# Patient Record
Sex: MALE | Race: BLACK OR AFRICAN AMERICAN | NOT HISPANIC OR LATINO | Employment: OTHER | URBAN - METROPOLITAN AREA
[De-identification: names, ages, dates, MRNs, and addresses within clinical notes are randomized per-mention and may not be internally consistent; named-entity substitution may affect disease eponyms.]

---

## 2024-03-12 ENCOUNTER — APPOINTMENT (OUTPATIENT)
Dept: CARDIOLOGY | Facility: HOSPITAL | Age: 74
End: 2024-03-12
Payer: MEDICARE

## 2024-03-12 ENCOUNTER — APPOINTMENT (OUTPATIENT)
Dept: RADIOLOGY | Facility: HOSPITAL | Age: 74
End: 2024-03-12
Payer: MEDICARE

## 2024-03-12 ENCOUNTER — HOSPITAL ENCOUNTER (OUTPATIENT)
Facility: HOSPITAL | Age: 74
Setting detail: OBSERVATION
Discharge: NURSING, REHAB OR LONG-TERM CARE | End: 2024-03-14
Attending: EMERGENCY MEDICINE | Admitting: INTERNAL MEDICINE
Payer: MEDICARE

## 2024-03-12 DIAGNOSIS — I10 HYPERTENSION, UNSPECIFIED TYPE: ICD-10-CM

## 2024-03-12 DIAGNOSIS — R40.4 UNRESPONSIVE EPISODE: Primary | ICD-10-CM

## 2024-03-12 LAB
ALBUMIN SERPL BCP-MCNC: 4.1 G/DL (ref 3.4–5)
ALP SERPL-CCNC: 88 U/L (ref 33–136)
ALT SERPL W P-5'-P-CCNC: 10 U/L (ref 10–52)
ANION GAP SERPL CALC-SCNC: 11 MMOL/L (ref 10–20)
APPEARANCE UR: CLEAR
AST SERPL W P-5'-P-CCNC: 22 U/L (ref 9–39)
BASOPHILS # BLD AUTO: 0.05 X10*3/UL (ref 0–0.1)
BASOPHILS NFR BLD AUTO: 0.9 %
BILIRUB SERPL-MCNC: 0.3 MG/DL (ref 0–1.2)
BILIRUB UR STRIP.AUTO-MCNC: NEGATIVE MG/DL
BUN SERPL-MCNC: 15 MG/DL (ref 6–23)
CALCIUM SERPL-MCNC: 10.3 MG/DL (ref 8.6–10.3)
CARDIAC TROPONIN I PNL SERPL HS: 8 NG/L (ref 0–20)
CARDIAC TROPONIN I PNL SERPL HS: 9 NG/L (ref 0–20)
CHLORIDE SERPL-SCNC: 105 MMOL/L (ref 98–107)
CO2 SERPL-SCNC: 25 MMOL/L (ref 21–32)
COLOR UR: YELLOW
CREAT SERPL-MCNC: 1.24 MG/DL (ref 0.5–1.3)
EGFRCR SERPLBLD CKD-EPI 2021: 61 ML/MIN/1.73M*2
EOSINOPHIL # BLD AUTO: 0.1 X10*3/UL (ref 0–0.4)
EOSINOPHIL NFR BLD AUTO: 1.8 %
ERYTHROCYTE [DISTWIDTH] IN BLOOD BY AUTOMATED COUNT: 14.3 % (ref 11.5–14.5)
GLUCOSE SERPL-MCNC: 109 MG/DL (ref 74–99)
GLUCOSE UR STRIP.AUTO-MCNC: NEGATIVE MG/DL
HCT VFR BLD AUTO: 44 % (ref 41–52)
HGB BLD-MCNC: 13.7 G/DL (ref 13.5–17.5)
IMM GRANULOCYTES # BLD AUTO: 0.01 X10*3/UL (ref 0–0.5)
IMM GRANULOCYTES NFR BLD AUTO: 0.2 % (ref 0–0.9)
INR PPP: 1.1 (ref 0.9–1.1)
KETONES UR STRIP.AUTO-MCNC: NEGATIVE MG/DL
LEUKOCYTE ESTERASE UR QL STRIP.AUTO: NEGATIVE
LYMPHOCYTES # BLD AUTO: 1.93 X10*3/UL (ref 0.8–3)
LYMPHOCYTES NFR BLD AUTO: 34.5 %
MAGNESIUM SERPL-MCNC: 2.28 MG/DL (ref 1.6–2.4)
MCH RBC QN AUTO: 28.5 PG (ref 26–34)
MCHC RBC AUTO-ENTMCNC: 31.1 G/DL (ref 32–36)
MCV RBC AUTO: 92 FL (ref 80–100)
MONOCYTES # BLD AUTO: 0.39 X10*3/UL (ref 0.05–0.8)
MONOCYTES NFR BLD AUTO: 7 %
NEUTROPHILS # BLD AUTO: 3.12 X10*3/UL (ref 1.6–5.5)
NEUTROPHILS NFR BLD AUTO: 55.6 %
NITRITE UR QL STRIP.AUTO: NEGATIVE
NRBC BLD-RTO: 0 /100 WBCS (ref 0–0)
PH UR STRIP.AUTO: 5 [PH]
PLATELET # BLD AUTO: 322 X10*3/UL (ref 150–450)
POTASSIUM SERPL-SCNC: 5.2 MMOL/L (ref 3.5–5.3)
PROT SERPL-MCNC: 7.9 G/DL (ref 6.4–8.2)
PROT UR STRIP.AUTO-MCNC: NEGATIVE MG/DL
PROTHROMBIN TIME: 12.1 SECONDS (ref 9.8–12.8)
RBC # BLD AUTO: 4.81 X10*6/UL (ref 4.5–5.9)
RBC # UR STRIP.AUTO: NEGATIVE /UL
SODIUM SERPL-SCNC: 136 MMOL/L (ref 136–145)
SP GR UR STRIP.AUTO: 1.02
UROBILINOGEN UR STRIP.AUTO-MCNC: <2 MG/DL
WBC # BLD AUTO: 5.6 X10*3/UL (ref 4.4–11.3)

## 2024-03-12 PROCEDURE — 81003 URINALYSIS AUTO W/O SCOPE: CPT

## 2024-03-12 PROCEDURE — G0378 HOSPITAL OBSERVATION PER HR: HCPCS

## 2024-03-12 PROCEDURE — 85025 COMPLETE CBC W/AUTO DIFF WBC: CPT

## 2024-03-12 PROCEDURE — 93010 ELECTROCARDIOGRAM REPORT: CPT | Performed by: EMERGENCY MEDICINE

## 2024-03-12 PROCEDURE — 99285 EMERGENCY DEPT VISIT HI MDM: CPT | Performed by: EMERGENCY MEDICINE

## 2024-03-12 PROCEDURE — 99285 EMERGENCY DEPT VISIT HI MDM: CPT | Mod: 25

## 2024-03-12 PROCEDURE — 84484 ASSAY OF TROPONIN QUANT: CPT

## 2024-03-12 PROCEDURE — 93005 ELECTROCARDIOGRAM TRACING: CPT | Mod: 59

## 2024-03-12 PROCEDURE — 94640 AIRWAY INHALATION TREATMENT: CPT

## 2024-03-12 PROCEDURE — 2500000002 HC RX 250 W HCPCS SELF ADMINISTERED DRUGS (ALT 637 FOR MEDICARE OP, ALT 636 FOR OP/ED): Performed by: INTERNAL MEDICINE

## 2024-03-12 PROCEDURE — 70450 CT HEAD/BRAIN W/O DYE: CPT

## 2024-03-12 PROCEDURE — 85610 PROTHROMBIN TIME: CPT

## 2024-03-12 PROCEDURE — 2500000004 HC RX 250 GENERAL PHARMACY W/ HCPCS (ALT 636 FOR OP/ED): Performed by: INTERNAL MEDICINE

## 2024-03-12 PROCEDURE — 99222 1ST HOSP IP/OBS MODERATE 55: CPT | Performed by: NURSE PRACTITIONER

## 2024-03-12 PROCEDURE — 99222 1ST HOSP IP/OBS MODERATE 55: CPT

## 2024-03-12 PROCEDURE — 71045 X-RAY EXAM CHEST 1 VIEW: CPT

## 2024-03-12 PROCEDURE — 70450 CT HEAD/BRAIN W/O DYE: CPT | Performed by: RADIOLOGY

## 2024-03-12 PROCEDURE — 83735 ASSAY OF MAGNESIUM: CPT

## 2024-03-12 PROCEDURE — 2500000001 HC RX 250 WO HCPCS SELF ADMINISTERED DRUGS (ALT 637 FOR MEDICARE OP): Performed by: NURSE PRACTITIONER

## 2024-03-12 PROCEDURE — 71045 X-RAY EXAM CHEST 1 VIEW: CPT | Mod: FOREIGN READ | Performed by: RADIOLOGY

## 2024-03-12 PROCEDURE — 36415 COLL VENOUS BLD VENIPUNCTURE: CPT

## 2024-03-12 PROCEDURE — 80053 COMPREHEN METABOLIC PANEL: CPT

## 2024-03-12 RX ORDER — CARVEDILOL 25 MG/1
25 TABLET ORAL
Status: DISCONTINUED | OUTPATIENT
Start: 2024-03-12 | End: 2024-03-14 | Stop reason: HOSPADM

## 2024-03-12 RX ORDER — CLOPIDOGREL BISULFATE 75 MG/1
75 TABLET ORAL DAILY
Status: DISCONTINUED | OUTPATIENT
Start: 2024-03-12 | End: 2024-03-14 | Stop reason: HOSPADM

## 2024-03-12 RX ORDER — UBIDECARENONE 75 MG
250 CAPSULE ORAL DAILY
Status: DISCONTINUED | OUTPATIENT
Start: 2024-03-12 | End: 2024-03-14 | Stop reason: HOSPADM

## 2024-03-12 RX ORDER — IPRATROPIUM BROMIDE 0.5 MG/2.5ML
0.5 SOLUTION RESPIRATORY (INHALATION)
Status: DISCONTINUED | OUTPATIENT
Start: 2024-03-13 | End: 2024-03-14 | Stop reason: HOSPADM

## 2024-03-12 RX ORDER — LISINOPRIL 2.5 MG/1
2.5 TABLET ORAL DAILY
Status: DISCONTINUED | OUTPATIENT
Start: 2024-03-13 | End: 2024-03-14 | Stop reason: HOSPADM

## 2024-03-12 RX ORDER — ALBUTEROL SULFATE 0.83 MG/ML
2.5 SOLUTION RESPIRATORY (INHALATION) EVERY 6 HOURS PRN
Status: DISCONTINUED | OUTPATIENT
Start: 2024-03-12 | End: 2024-03-14 | Stop reason: HOSPADM

## 2024-03-12 RX ORDER — ENOXAPARIN SODIUM 100 MG/ML
40 INJECTION SUBCUTANEOUS EVERY 24 HOURS
Status: DISCONTINUED | OUTPATIENT
Start: 2024-03-12 | End: 2024-03-14 | Stop reason: HOSPADM

## 2024-03-12 RX ORDER — ATORVASTATIN CALCIUM 40 MG/1
40 TABLET, FILM COATED ORAL DAILY
COMMUNITY

## 2024-03-12 RX ORDER — NITROGLYCERIN 0.4 MG/1
0.4 TABLET SUBLINGUAL EVERY 5 MIN PRN
COMMUNITY

## 2024-03-12 RX ORDER — PANTOPRAZOLE SODIUM 20 MG/1
20 TABLET, DELAYED RELEASE ORAL
Status: DISCONTINUED | OUTPATIENT
Start: 2024-03-13 | End: 2024-03-14 | Stop reason: HOSPADM

## 2024-03-12 RX ORDER — IPRATROPIUM BROMIDE 0.5 MG/2.5ML
0.5 SOLUTION RESPIRATORY (INHALATION)
Status: DISCONTINUED | OUTPATIENT
Start: 2024-03-12 | End: 2024-03-12

## 2024-03-12 RX ORDER — RIVASTIGMINE TARTRATE 1.5 MG/1
1.5 CAPSULE ORAL 2 TIMES DAILY
COMMUNITY

## 2024-03-12 RX ORDER — NAPROXEN SODIUM 220 MG/1
81 TABLET, FILM COATED ORAL DAILY
COMMUNITY

## 2024-03-12 RX ORDER — LISINOPRIL AND HYDROCHLOROTHIAZIDE 12.5; 2 MG/1; MG/1
1 TABLET ORAL DAILY
Status: ON HOLD | COMMUNITY
End: 2024-03-14 | Stop reason: WASHOUT

## 2024-03-12 RX ORDER — NITROGLYCERIN 0.4 MG/1
0.4 TABLET SUBLINGUAL EVERY 5 MIN PRN
Status: DISCONTINUED | OUTPATIENT
Start: 2024-03-12 | End: 2024-03-14 | Stop reason: HOSPADM

## 2024-03-12 RX ORDER — CYANOCOBALAMIN (VITAMIN B-12) 250 MCG
250 TABLET ORAL DAILY
COMMUNITY

## 2024-03-12 RX ORDER — CLOPIDOGREL BISULFATE 75 MG/1
75 TABLET ORAL DAILY
COMMUNITY

## 2024-03-12 RX ORDER — TALC
3 POWDER (GRAM) TOPICAL NIGHTLY
COMMUNITY

## 2024-03-12 RX ORDER — CILOSTAZOL 100 MG/1
100 TABLET ORAL 2 TIMES DAILY
COMMUNITY

## 2024-03-12 RX ORDER — PANTOPRAZOLE SODIUM 20 MG/1
20 TABLET, DELAYED RELEASE ORAL
COMMUNITY

## 2024-03-12 RX ORDER — ATORVASTATIN CALCIUM 40 MG/1
40 TABLET, FILM COATED ORAL NIGHTLY
Status: DISCONTINUED | OUTPATIENT
Start: 2024-03-12 | End: 2024-03-14 | Stop reason: HOSPADM

## 2024-03-12 RX ORDER — CARVEDILOL 25 MG/1
25 TABLET ORAL
COMMUNITY

## 2024-03-12 RX ORDER — FLUOXETINE 10 MG/1
10 CAPSULE ORAL DAILY
Status: DISCONTINUED | OUTPATIENT
Start: 2024-03-12 | End: 2024-03-14 | Stop reason: HOSPADM

## 2024-03-12 RX ORDER — TIOTROPIUM BROMIDE INHALATION SPRAY 3.12 UG/1
2 SPRAY, METERED RESPIRATORY (INHALATION) DAILY
COMMUNITY

## 2024-03-12 RX ORDER — RIVASTIGMINE TARTRATE 1.5 MG/1
1.5 CAPSULE ORAL 2 TIMES DAILY
Status: DISCONTINUED | OUTPATIENT
Start: 2024-03-12 | End: 2024-03-14 | Stop reason: HOSPADM

## 2024-03-12 RX ORDER — FLUOXETINE 10 MG/1
10 CAPSULE ORAL DAILY
COMMUNITY

## 2024-03-12 RX ORDER — NAPROXEN SODIUM 220 MG/1
81 TABLET, FILM COATED ORAL DAILY
Status: DISCONTINUED | OUTPATIENT
Start: 2024-03-12 | End: 2024-03-14 | Stop reason: HOSPADM

## 2024-03-12 RX ORDER — ISOSORBIDE MONONITRATE 30 MG/1
30 TABLET, EXTENDED RELEASE ORAL DAILY
Status: DISCONTINUED | OUTPATIENT
Start: 2024-03-12 | End: 2024-03-14 | Stop reason: HOSPADM

## 2024-03-12 RX ORDER — ISOSORBIDE MONONITRATE 30 MG/1
30 TABLET, EXTENDED RELEASE ORAL DAILY
COMMUNITY

## 2024-03-12 RX ORDER — CILOSTAZOL 100 MG/1
100 TABLET ORAL 2 TIMES DAILY
Status: DISCONTINUED | OUTPATIENT
Start: 2024-03-12 | End: 2024-03-14 | Stop reason: HOSPADM

## 2024-03-12 RX ORDER — TALC
3 POWDER (GRAM) TOPICAL NIGHTLY
Status: DISCONTINUED | OUTPATIENT
Start: 2024-03-12 | End: 2024-03-14 | Stop reason: HOSPADM

## 2024-03-12 RX ADMIN — CILOSTAZOL 100 MG: 100 TABLET ORAL at 21:04

## 2024-03-12 RX ADMIN — ENOXAPARIN SODIUM 40 MG: 40 INJECTION SUBCUTANEOUS at 17:58

## 2024-03-12 RX ADMIN — IPRATROPIUM BROMIDE 0.5 MG: 0.5 SOLUTION RESPIRATORY (INHALATION) at 21:11

## 2024-03-12 RX ADMIN — CARVEDILOL 25 MG: 25 TABLET, FILM COATED ORAL at 17:58

## 2024-03-12 RX ADMIN — RIVASTIGMINE TARTRATE 1.5 MG: 1.5 CAPSULE ORAL at 21:03

## 2024-03-12 RX ADMIN — Medication 3 MG: at 21:03

## 2024-03-12 RX ADMIN — ATORVASTATIN CALCIUM 40 MG: 40 TABLET, FILM COATED ORAL at 21:04

## 2024-03-12 SDOH — SOCIAL STABILITY: SOCIAL INSECURITY: HAS ANYONE EVER THREATENED TO HURT YOUR FAMILY OR YOUR PETS?: UNABLE TO ASSESS

## 2024-03-12 SDOH — SOCIAL STABILITY: SOCIAL INSECURITY: DO YOU FEEL UNSAFE GOING BACK TO THE PLACE WHERE YOU ARE LIVING?: UNABLE TO ASSESS

## 2024-03-12 SDOH — SOCIAL STABILITY: SOCIAL INSECURITY: ARE THERE ANY APPARENT SIGNS OF INJURIES/BEHAVIORS THAT COULD BE RELATED TO ABUSE/NEGLECT?: UNABLE TO ASSESS

## 2024-03-12 SDOH — SOCIAL STABILITY: SOCIAL INSECURITY: DOES ANYONE TRY TO KEEP YOU FROM HAVING/CONTACTING OTHER FRIENDS OR DOING THINGS OUTSIDE YOUR HOME?: UNABLE TO ASSESS

## 2024-03-12 SDOH — SOCIAL STABILITY: SOCIAL INSECURITY: HAVE YOU HAD THOUGHTS OF HARMING ANYONE ELSE?: UNABLE TO ASSESS

## 2024-03-12 SDOH — SOCIAL STABILITY: SOCIAL INSECURITY: DO YOU FEEL ANYONE HAS EXPLOITED OR TAKEN ADVANTAGE OF YOU FINANCIALLY OR OF YOUR PERSONAL PROPERTY?: UNABLE TO ASSESS

## 2024-03-12 SDOH — SOCIAL STABILITY: SOCIAL INSECURITY: ABUSE: ADULT

## 2024-03-12 SDOH — SOCIAL STABILITY: SOCIAL INSECURITY: WERE YOU ABLE TO COMPLETE ALL THE BEHAVIORAL HEALTH SCREENINGS?: YES

## 2024-03-12 SDOH — SOCIAL STABILITY: SOCIAL INSECURITY: ARE YOU OR HAVE YOU BEEN THREATENED OR ABUSED PHYSICALLY, EMOTIONALLY, OR SEXUALLY BY ANYONE?: UNABLE TO ASSESS

## 2024-03-12 ASSESSMENT — ACTIVITIES OF DAILY LIVING (ADL)
WALKS IN HOME: DEPENDENT
JUDGMENT_ADEQUATE_SAFELY_COMPLETE_DAILY_ACTIVITIES: NO
ADEQUATE_TO_COMPLETE_ADL: NO
DRESSING YOURSELF: DEPENDENT
BATHING: DEPENDENT
HEARING - RIGHT EAR: FUNCTIONAL
GROOMING: DEPENDENT
LACK_OF_TRANSPORTATION: PATIENT UNABLE TO ANSWER
FEEDING YOURSELF: DEPENDENT
PATIENT'S MEMORY ADEQUATE TO SAFELY COMPLETE DAILY ACTIVITIES?: NO
ASSISTIVE_DEVICE: WALKER
HEARING - LEFT EAR: FUNCTIONAL
TOILETING: DEPENDENT

## 2024-03-12 ASSESSMENT — ENCOUNTER SYMPTOMS
ABDOMINAL PAIN: 0
CONFUSION: 1
WEAKNESS: 1
DIARRHEA: 0
DYSURIA: 0
COLOR CHANGE: 0
FEVER: 0
SHORTNESS OF BREATH: 0
DIZZINESS: 0
CHILLS: 0
SORE THROAT: 0
AGITATION: 0
RHINORRHEA: 0
CHEST TIGHTNESS: 0
PALPITATIONS: 0
ABDOMINAL DISTENTION: 0
NUMBNESS: 0
SINUS PAIN: 0
COUGH: 0
TROUBLE SWALLOWING: 0
FREQUENCY: 0
NECK STIFFNESS: 0
VOMITING: 0
NECK PAIN: 0
WOUND: 0
EYE PAIN: 0
FATIGUE: 0
NAUSEA: 0

## 2024-03-12 ASSESSMENT — COGNITIVE AND FUNCTIONAL STATUS - GENERAL
EATING MEALS: A LITTLE
HELP NEEDED FOR BATHING: A LOT
PERSONAL GROOMING: A LITTLE
DAILY ACTIVITIY SCORE: 15
STANDING UP FROM CHAIR USING ARMS: A LITTLE
WALKING IN HOSPITAL ROOM: A LOT
TOILETING: A LITTLE
MOVING TO AND FROM BED TO CHAIR: A LOT
PATIENT BASELINE BEDBOUND: UNABLE TO ASSESS AT THIS TIME
DRESSING REGULAR UPPER BODY CLOTHING: A LOT
CLIMB 3 TO 5 STEPS WITH RAILING: A LOT
MOBILITY SCORE: 15
DAILY ACTIVITIY SCORE: 16
DRESSING REGULAR UPPER BODY CLOTHING: A LOT
MOVING TO AND FROM BED TO CHAIR: A LOT
HELP NEEDED FOR BATHING: A LOT
STANDING UP FROM CHAIR USING ARMS: A LOT
WALKING IN HOSPITAL ROOM: A LOT
DRESSING REGULAR LOWER BODY CLOTHING: A LITTLE
TURNING FROM BACK TO SIDE WHILE IN FLAT BAD: A LITTLE
DRESSING REGULAR LOWER BODY CLOTHING: A LOT
PERSONAL GROOMING: A LITTLE
MOBILITY SCORE: 17
CLIMB 3 TO 5 STEPS WITH RAILING: A LOT
TOILETING: A LITTLE
EATING MEALS: A LITTLE

## 2024-03-12 ASSESSMENT — LIFESTYLE VARIABLES
AUDIT-C TOTAL SCORE: 0
SKIP TO QUESTIONS 9-10: 1
HOW OFTEN DO YOU HAVE A DRINK CONTAINING ALCOHOL: NEVER
HAVE YOU EVER FELT YOU SHOULD CUT DOWN ON YOUR DRINKING: NO
EVER HAD A DRINK FIRST THING IN THE MORNING TO STEADY YOUR NERVES TO GET RID OF A HANGOVER: NO
AUDIT-C TOTAL SCORE: 0
EVER FELT BAD OR GUILTY ABOUT YOUR DRINKING: NO
HOW MANY STANDARD DRINKS CONTAINING ALCOHOL DO YOU HAVE ON A TYPICAL DAY: PATIENT DOES NOT DRINK
HOW OFTEN DO YOU HAVE 6 OR MORE DRINKS ON ONE OCCASION: NEVER
HAVE PEOPLE ANNOYED YOU BY CRITICIZING YOUR DRINKING: NO

## 2024-03-12 ASSESSMENT — PAIN - FUNCTIONAL ASSESSMENT
PAIN_FUNCTIONAL_ASSESSMENT: 0-10

## 2024-03-12 ASSESSMENT — VISUAL ACUITY: VA_NORMAL: 1

## 2024-03-12 ASSESSMENT — PATIENT HEALTH QUESTIONNAIRE - PHQ9
1. LITTLE INTEREST OR PLEASURE IN DOING THINGS: NOT AT ALL
2. FEELING DOWN, DEPRESSED OR HOPELESS: NOT AT ALL
SUM OF ALL RESPONSES TO PHQ9 QUESTIONS 1 & 2: 0

## 2024-03-12 ASSESSMENT — COLUMBIA-SUICIDE SEVERITY RATING SCALE - C-SSRS
2. HAVE YOU ACTUALLY HAD ANY THOUGHTS OF KILLING YOURSELF?: NO
1. IN THE PAST MONTH, HAVE YOU WISHED YOU WERE DEAD OR WISHED YOU COULD GO TO SLEEP AND NOT WAKE UP?: NO
6. HAVE YOU EVER DONE ANYTHING, STARTED TO DO ANYTHING, OR PREPARED TO DO ANYTHING TO END YOUR LIFE?: NO

## 2024-03-12 ASSESSMENT — PAIN SCALES - GENERAL
PAINLEVEL_OUTOF10: 0 - NO PAIN

## 2024-03-12 NOTE — H&P
HISTORY AND PHYSICAL EXAMINATION - INTERNAL MEDICINE    PATIENT NAME: Bret Harmon  MRN: [id number]  SERVICE DATE: 3/12/2024  SERVICE TIME:  3:15 PM    PRIMARY CARE PHYSICIAN:  Shon Ford MD    SUBJECTIVE  CHIEF COMPLAINT:  unresponsive episode    HISTORY OF PRESENT ILLNESS:  Mr. Harmon is a 73 y.o.  male PMH CAD, PAD, COPD, dementia (A&O x 1 at baseline), hypertension, dyslipidemia, prediabetes, depression sent to ER from [facility] after unresponsive episode.    Patient is currently alert and oriented x 2.  He is able to tell me his name.  He knows that he is in the hospital.  He does not recall the events leading to his hospitalization hence history is collected from ER physician.    Per ER physician, patient had an unresponsive episode around 850 this morning when he was being fed by staff.  Per nursing home report, patient was being fed by staff when he suddenly looked to his right then dipped his chin to his chest and became unresponsive.  Staff lowered him to the floor where he was notably staring at the ceiling and not moving his eyes.  He was not responsive even to sternal rub.  There was no jerking or myoclonic activity.  The episode lasted for approximately 10 minutes before he gradually became more alert and interactive.  By the time EMS arrived 20 minutes later he was able to stand with assist and was back to his baseline mentation (reportedly A&O x 1 at baseline).    At time of my evaluation in the ER this afternoon, patient is resting comfortably in bed.  He has no complaints and denies pain.  Reports he feels like his usual self.  He denies any fevers, chills, chest pain, shortness of breath, abdominal pain, diarrhea, dysuria.    ER workup reviewed:  Patient afebrile and hemodynamically stable  CBC and CMP unremarkable  EKG normal sinus rhythm; right bundle branch block; no acute ischemic changes  Troponins negative x 2  CT head no acute intracranial pathology  Chest x-ray  "unremarkable  Urinalysis negative    Patient to be admitted on telemetry with consults to neurology and cardiology given unresponsive episode of unclear etiology prior to admission.    REVIEW OF SYSTEMS:  A complete 10-point review of systems was performed and negative except per HPI.    PAST MEDICAL HISTORY:  CAD, PAD, COPD, dementia (A&O x 1 at baseline), hypertension, dyslipidemia, prediabetes, depression, GERD    PAST SURGICAL HISTORY: Unknown; patient thinks he has had prior intervention in a leg for PAD    FAMILY HISTORY: Reviewed and noncontributory    SOCIAL HISTORY: Reports he is a former cigarette smoker.  States he quit smoking many years ago.  Currently resides at [facility]    MEDICATIONS:  (Not in a hospital admission)      ALLERGIES:  Allergies   Allergen Reactions    Bee Pollen Runny nose       OBJECTIVE  PHYSICAL EXAM:  /62   Pulse 72   Temp 36.8 °C (98.2 °F) (Temporal)   Resp 18   Ht 1.753 m (5' 9\")   Wt 90.7 kg (200 lb)   SpO2 100%   BMI 29.53 kg/m²   GENERAL: No acute distress.  EYES: Extraocular movements intact.  No scleral icterus.  EARS:  External ears normal.  HEAD: Normocephalic and atraumatic.  NECK: Supple neck.  HEART: Regular rate.      LUNGS: Chest clear to auscultation bilaterally.    ABDOMEN: Soft, non-tender, non-distended.      EXTREMITIES: No cyanosis or edema.  SKIN: Warm and dry.  No rashes.    NEUROLOGIC: Alert and oriented to person and place (knows he is in the hospital).  Does not know the year  PSYCHIATRIC: Appropriate mood and affect.    DATA:  Diagnostic tests reviewed for today's visit:  Most recent labs and imaging results.    ASSESSMENT & PLAN    Unresponsive episode, resolved  Unclear etiology of unresponsive episode prior to admission.  He has returned to baseline and has no complaints presently.  No metabolic abnormalities seen on admission labs.  CT head no acute intracranial pathology.  No evidence of arrhythmia or acute ischemia on EKG; " troponins negative.  Cannot rule out seizure and appreciate neurology consultation.  It is also possible that patient simply fell asleep while eating earlier and was difficult to arouse.      Neurology has evaluated patient and recommend EEG and MRI brain with and without contrast.  I have ordered these studies.  Maintain seizure precautions.  Additionally appreciate cardiology evaluation given patient's cardiovascular history including CAD and PAD by report.  Echocardiogram ordered.  Maintain telemetry to monitor for arrhythmia.  Discussed with ER nurse to obtain orthostatic vital signs.      History of CAD  History of PAD   Continue home daily baby aspirin, Plavix 75 mg daily, cilostazol 100 mg twice daily, Lipitor 40 mg daily, carvedilol 25 mg twice daily, lisinopril 2.5 mg daily, Imdur 30 mg daily.    History of depression  Continue home Prozac 10 mg daily    Dementia  Mentation currently at baseline.  Maintain delirium precautions.  Continue home rivastigmine 1.5 mg twice daily.    COPD  Lungs clear on exam.  Continue home Spiriva.  Albuterol nebs ordered as needed.    History of GERD  Continue home Protonix 20 mg daily    VTE prophylaxis  Subcutaneous Lovenox ordered    Disposition planning:  to be determined; anticipate return to [facility] when medically ready    Plan of care discussed with: Provider, RN, Patient, ER physician, medicine nurse practitioner.    SIGNATURE: Melly Morrow MD  March 12, 2024  3:15 PM

## 2024-03-12 NOTE — ED PROVIDER NOTES
EMERGENCY DEPARTMENT ENCOUNTER      Pt Name: Bret Harmon  MRN: [id number]  Birthdate 1950  Date of evaluation: 3/12/2024  Provider: Vince Rogers MD    CHIEF COMPLAINT       Chief Complaint   Patient presents with    Altered Mental Status         HISTORY OF PRESENT ILLNESS    HPI  Patient is 73-year-old male with a history of dementia, HTN, HLD presenting after an unresponsive episode at his nursing home.  Approximately 8:50 AM, patient was being fed by staff when he suddenly looked to his right than dipped his chin to his chest.  At that time, he was completely unresponsive.  Staff lowered him to the floor where he was notably staring at the ceiling and not moving his eyes.  He was completely unresponsive at that time even to sternal rub.  He did not move and they noticed no myoclonic activity. This episode lasted for at least ten minutes when he gradually began to become more alert and interactive.  By the time EMS arrived at 9:10 AM, he was able to stand with assist and was back to his baseline mentation of alert and oriented x 1 and able to answer simple questions.  This was consistent with his appearance in the emergency department.  At presentation, patient is pan ROS negative and has no complaints.    Nursing Notes were reviewed.    PAST MEDICAL HISTORY   History reviewed. No pertinent past medical history.      SURGICAL HISTORY     History reviewed. No pertinent surgical history.      CURRENT MEDICATIONS       Previous Medications    No medications on file       ALLERGIES     Patient has no known allergies.    FAMILY HISTORY     No family history on file.       SOCIAL HISTORY       Social History     Socioeconomic History    Marital status: Single     Spouse name: None    Number of children: None    Years of education: None    Highest education level: None   Occupational History    None   Tobacco Use    Smoking status: None    Smokeless tobacco: None   Substance and Sexual Activity    Alcohol use: None     Drug use: None    Sexual activity: None   Other Topics Concern    None   Social History Narrative    None     Social Determinants of Health     Financial Resource Strain: Not on file   Food Insecurity: Not on file   Transportation Needs: Not on file   Physical Activity: Not on file   Stress: Not on file   Social Connections: Not on file   Intimate Partner Violence: Not on file   Housing Stability: Not on file       SCREENINGS                        PHYSICAL EXAM    (up to 7 for level 4, 8 or more for level 5)     ED Triage Vitals [03/12/24 0933]   Temperature Heart Rate Respirations BP   36.8 °C (98.2 °F) 86 18 141/69      Pulse Ox Temp Source Heart Rate Source Patient Position   99 % Temporal Monitor Sitting      BP Location FiO2 (%)     Right arm --       Physical Exam  Vitals and nursing note reviewed.   Constitutional:       General: He is not in acute distress.     Appearance: He is not toxic-appearing.   HENT:      Head: Normocephalic and atraumatic.      Mouth/Throat:      Mouth: Mucous membranes are moist.      Pharynx: Oropharynx is clear.   Eyes:      Extraocular Movements: Extraocular movements intact.      Pupils: Pupils are equal, round, and reactive to light.   Cardiovascular:      Rate and Rhythm: Normal rate and regular rhythm.      Heart sounds: Normal heart sounds.   Pulmonary:      Effort: Pulmonary effort is normal. No respiratory distress.      Breath sounds: Normal breath sounds.   Abdominal:      General: There is no distension.      Palpations: Abdomen is soft.      Tenderness: There is no abdominal tenderness.   Musculoskeletal:         General: No swelling or tenderness.      Cervical back: Normal range of motion and neck supple.   Skin:     General: Skin is warm and dry.   Neurological:      Mental Status: He is alert.      Comments: Oriented to person only.  Moving all limbs spontaneously.  No sensory deficits.          DIAGNOSTIC RESULTS     LABS:  Labs Reviewed   CBC WITH AUTO  DIFFERENTIAL - Abnormal       Result Value    WBC 5.6      nRBC 0.0      RBC 4.81      Hemoglobin 13.7      Hematocrit 44.0      MCV 92      MCH 28.5      MCHC 31.1 (*)     RDW 14.3      Platelets 322      Neutrophils % 55.6      Immature Granulocytes %, Automated 0.2      Lymphocytes % 34.5      Monocytes % 7.0      Eosinophils % 1.8      Basophils % 0.9      Neutrophils Absolute 3.12      Immature Granulocytes Absolute, Automated 0.01      Lymphocytes Absolute 1.93      Monocytes Absolute 0.39      Eosinophils Absolute 0.10      Basophils Absolute 0.05     COMPREHENSIVE METABOLIC PANEL - Abnormal    Glucose 109 (*)     Sodium 136      Potassium 5.2      Chloride 105      Bicarbonate 25      Anion Gap 11      Urea Nitrogen 15      Creatinine 1.24      eGFR 61      Calcium 10.3      Albumin 4.1      Alkaline Phosphatase 88      Total Protein 7.9      AST 22      Bilirubin, Total 0.3      ALT 10     MAGNESIUM - Normal    Magnesium 2.28     PROTIME-INR - Normal    Protime 12.1      INR 1.1     SERIAL TROPONIN-INITIAL - Normal    Troponin I, High Sensitivity 8      Narrative:     Less than 99th percentile of normal range cutoff-  Female and children under 18 years old <14 ng/L; Male <21 ng/L: Negative  Repeat testing should be performed if clinically indicated.     Female and children under 18 years old 14-50 ng/L; Male 21-50 ng/L:  Consistent with possible cardiac damage and possible increased clinical   risk. Serial measurements may help to assess extent of myocardial damage.     >50 ng/L: Consistent with cardiac damage, increased clinical risk and  myocardial infarction. Serial measurements may help assess extent of   myocardial damage.      NOTE: Children less than 1 year old may have higher baseline troponin   levels and results should be interpreted in conjunction with the overall   clinical context.     NOTE: Troponin I testing is performed using a different   testing methodology at [facility] than  at other   [facility] hospitals. Direct result comparisons should only   be made within the same method.   URINALYSIS WITH REFLEX CULTURE AND MICROSCOPIC - Normal    Color, Urine Yellow      Appearance, Urine Clear      Specific Gravity, Urine 1.021      pH, Urine 5.0      Protein, Urine NEGATIVE      Glucose, Urine NEGATIVE      Blood, Urine NEGATIVE      Ketones, Urine NEGATIVE      Bilirubin, Urine NEGATIVE      Urobilinogen, Urine <2.0      Nitrite, Urine NEGATIVE      Leukocyte Esterase, Urine NEGATIVE     SERIAL TROPONIN, 1 HOUR - Normal    Troponin I, High Sensitivity 9      Narrative:     Less than 99th percentile of normal range cutoff-  Female and children under 18 years old <14 ng/L; Male <21 ng/L: Negative  Repeat testing should be performed if clinically indicated.     Female and children under 18 years old 14-50 ng/L; Male 21-50 ng/L:  Consistent with possible cardiac damage and possible increased clinical   risk. Serial measurements may help to assess extent of myocardial damage.     >50 ng/L: Consistent with cardiac damage, increased clinical risk and  myocardial infarction. Serial measurements may help assess extent of   myocardial damage.      NOTE: Children less than 1 year old may have higher baseline troponin   levels and results should be interpreted in conjunction with the overall   clinical context.     NOTE: Troponin I testing is performed using a different   testing methodology at [facility] than at [facility]. Direct result comparisons should only   be made within the same method.   TROPONIN SERIES- (INITIAL, 1 HR)    Narrative:     The following orders were created for panel order Troponin I Series, High Sensitivity (0, 1 HR).  Procedure                               Abnormality         Status                     ---------                               -----------         ------                     Troponin I, High Sensiti...[[id number]]  Normal              Final  result               Troponin, High Sensitivi...[[id number]]  Normal              Final result                 Please view results for these tests on the individual orders.   URINALYSIS WITH REFLEX CULTURE AND MICROSCOPIC    Narrative:     The following orders were created for panel order Urinalysis with Reflex Culture and Microscopic.  Procedure                               Abnormality         Status                     ---------                               -----------         ------                     Urinalysis with Reflex C...[[id number]]  Normal              Final result               Extra Urine Gray Tube[[id number]]                            In process                   Please view results for these tests on the individual orders.   EXTRA URINE GRAY TUBE       All other labs were within normal range or not returned as of this dictation.    Imaging  XR chest 1 view   Final Result   No acute pulmonary pathology.   Signed by Stefano Subramanian MD      CT head wo IV contrast   Final Result   Large area of encephalomalacia in the right occipital lobe, which may   be related to prior infarct.        Diffuse volume loss and periventricular white matter changes, which   given patient's age likely represent small vessel ischemic disease.        No CT evidence of acute hemorrhage or acute cortical infarct.        MACRO:   None        Signed by: Cindy Britton 3/12/2024 10:41 AM   Dictation workstation:   [id number]           Procedures  Procedures     EMERGENCY DEPARTMENT COURSE/MDM:     Diagnoses as of 03/12/24 1433   Unresponsive episode        Medical Decision Making  History obtained from patient, EMS, nursing home staff.  Records including labs, imaging, notes reviewed as indicated.  Concern for possible cardiac versus neurologic cause of his unresponsive episode.  Possible syncope or seizure.  CT of the head was unremarkable for acute findings.  Troponin series, CMP, CBC, urinalysis, chest x-ray unremarkable for  acute findings.  Given the length of this unresponsive episode and that he has never had anything like this, the decision was made to admit the patient for further neurologic and cardiac workup.    EKG demonstrated normal sinus rhythm at a rate of 66 bpm, MT interval of 158, QRS duration 140 and QTc of 452.  No acute injury pattern seen.    Patient and or family in agreement and understanding of treatment plan.  All questions answered.      I reviewed the case with the attending ED physician. The attending ED physician agrees with the plan. Patient and/or patient´s representative was counseled regarding labs, imaging, likely diagnosis, and plan. All questions were answered.    ED Medications administered this visit:  Medications - No data to display    New Prescriptions from this visit:    New Prescriptions    No medications on file       Follow-up:  No follow-up provider specified.      Final Impression:   1. Unresponsive episode          (Please note that portions of this note were completed with a voice recognition program.  Efforts were made to edit the dictations but occasionally words are mis-transcribed.)     Vince Rogers MD  Resident  03/12/24 4221

## 2024-03-12 NOTE — H&P
History Of Present Illness  Bret Harmon is a 73 y.o. male from [facility] with past medical history of hypertension, hyperlipidemia, coronary artery disease, COPD, dementia presenting with unresponsive episode at [facility] without myoclonic jerks.  Patient poor historian due to dementia, information obtained from ER report and F records.  Patient's baseline is alert to person only which he is currently.  Patient denies any dizziness, vision changes, sweats, chills, shortness of breath, chest pain, palpitations, nausea/vomiting/diarrhea or urinary symptoms.     CT of the head done today negative for acute process.  Labs done today revealed sodium 136, potassium 5.2, chloride 105, CO2 25, BUN 15, creatinine 1.24, glucose 109, WBCs 5.6, hemoglobin 13.7, hematocrit 44, .  Past Medical History  Past Medical History:   Diagnosis Date    COPD (chronic obstructive pulmonary disease) (CMS/[facility])     Coronary artery disease     Dementia (CMS/[facility])     Depression     Dysphagia     Hyperlipidemia     Hypertension     Insomnia     Muscle weakness     Peripheral vascular disease (CMS/[facility])     Prediabetes     Protein calorie malnutrition (CMS/[facility])        Surgical History  History reviewed. No pertinent surgical history.     Social History  He reports that he has quit smoking. His smoking use included cigarettes. He does not have any smokeless tobacco history on file. He reports that he does not currently use alcohol. He reports that he does not use drugs.    Family History  Family History   Family history unknown: Yes        Allergies  Bee pollen    Review of Systems   Constitutional:  Negative for chills and fever.   HENT:  Negative for sore throat and trouble swallowing.    Eyes:  Negative for pain and visual disturbance.   Respiratory:  Negative for chest tightness and shortness of breath.    Cardiovascular:  Negative for chest pain and palpitations.   Gastrointestinal:  Negative for abdominal pain and nausea.   Genitourinary:   "Negative for dysuria and frequency.   Musculoskeletal:  Negative for neck pain and neck stiffness.   Skin:  Negative for color change, pallor, rash and wound.   Neurological:  Positive for weakness. Negative for dizziness and numbness.   Psychiatric/Behavioral:  Positive for confusion. Negative for agitation.         Physical Exam  Constitutional:       Appearance: Normal appearance.   HENT:      Head: Normocephalic and atraumatic.      Nose: Nose normal.      Mouth/Throat:      Mouth: Mucous membranes are dry.   Eyes:      Extraocular Movements: Extraocular movements intact.   Cardiovascular:      Rate and Rhythm: Normal rate and regular rhythm.      Pulses: Normal pulses.      Heart sounds: Normal heart sounds.   Pulmonary:      Effort: Pulmonary effort is normal.      Breath sounds: Normal breath sounds.   Abdominal:      General: Bowel sounds are normal.      Palpations: Abdomen is soft.   Musculoskeletal:         General: Normal range of motion.      Cervical back: Neck supple.   Skin:     General: Skin is warm and dry.      Capillary Refill: Capillary refill takes less than 2 seconds.   Neurological:      Mental Status: He is alert.      Comments: Oriented to self   Psychiatric:         Mood and Affect: Mood normal.         Behavior: Behavior normal.          Last Recorded Vitals  Blood pressure 167/73, pulse 67, temperature 36.8 °C (98.2 °F), temperature source Temporal, resp. rate 18, height 1.753 m (5' 9\"), weight 90.7 kg (200 lb), SpO2 98 %.    Relevant Results  No current facility-administered medications for this encounter.    Current Outpatient Medications:     aspirin 81 mg chewable tablet, Chew 1 tablet (81 mg) once daily., Disp: , Rfl:     atorvastatin (Lipitor) 40 mg tablet, Take 1 tablet (40 mg) by mouth once daily., Disp: , Rfl:     carvedilol (Coreg) 25 mg tablet, Take 1 tablet (25 mg) by mouth 2 times a day with meals., Disp: , Rfl:     cilostazol (Pletal) 100 mg tablet, Take 1 tablet (100 mg) " by mouth 2 times a day., Disp: , Rfl:     clopidogrel (Plavix) 75 mg tablet, Take 1 tablet (75 mg) by mouth once daily., Disp: , Rfl:     cyanocobalamin (Vitamin B-12) 250 mcg tablet, Take 1 tablet (250 mcg) by mouth once daily., Disp: , Rfl:     FLUoxetine (PROzac) 10 mg capsule, Take 1 capsule (10 mg) by mouth once daily., Disp: , Rfl:     isosorbide mononitrate ER (Imdur) 30 mg 24 hr tablet, Take 1 tablet (30 mg) by mouth once daily. Do not crush or chew., Disp: , Rfl:     lisinopriL-hydrochlorothiazide 20-12.5 mg tablet, Take 1 tablet by mouth once daily., Disp: , Rfl:     melatonin 3 mg tablet, Take 1 tablet (3 mg) by mouth once daily at bedtime., Disp: , Rfl:     nitroglycerin (Nitrostat) 0.4 mg SL tablet, Place 1 tablet (0.4 mg) under the tongue every 5 minutes if needed for chest pain., Disp: , Rfl:     pantoprazole (ProtoNix) 20 mg EC tablet, Take 1 tablet (20 mg) by mouth once daily in the morning. Take before meals. Do not crush, chew, or split., Disp: , Rfl:     rivastigmine (Exelon) 1.5 mg capsule, Take 1 capsule (1.5 mg) by mouth 2 times a day., Disp: , Rfl:     tiotropium (Spiriva Respimat) 2.5 mcg/actuation inhaler, Inhale 2 puffs once daily., Disp: , Rfl:    Results for orders placed or performed during the hospital encounter of 03/12/24 (from the past 24 hour(s))   CBC and Auto Differential   Result Value Ref Range    WBC 5.6 4.4 - 11.3 x10*3/uL    nRBC 0.0 0.0 - 0.0 /100 WBCs    RBC 4.81 4.50 - 5.90 x10*6/uL    Hemoglobin 13.7 13.5 - 17.5 g/dL    Hematocrit 44.0 41.0 - 52.0 %    MCV 92 80 - 100 fL    MCH 28.5 26.0 - 34.0 pg    MCHC 31.1 (L) 32.0 - 36.0 g/dL    RDW 14.3 11.5 - 14.5 %    Platelets 322 150 - 450 x10*3/uL    Neutrophils % 55.6 40.0 - 80.0 %    Immature Granulocytes %, Automated 0.2 0.0 - 0.9 %    Lymphocytes % 34.5 13.0 - 44.0 %    Monocytes % 7.0 2.0 - 10.0 %    Eosinophils % 1.8 0.0 - 6.0 %    Basophils % 0.9 0.0 - 2.0 %    Neutrophils Absolute 3.12 1.60 - 5.50 x10*3/uL    Immature  Granulocytes Absolute, Automated 0.01 0.00 - 0.50 x10*3/uL    Lymphocytes Absolute 1.93 0.80 - 3.00 x10*3/uL    Monocytes Absolute 0.39 0.05 - 0.80 x10*3/uL    Eosinophils Absolute 0.10 0.00 - 0.40 x10*3/uL    Basophils Absolute 0.05 0.00 - 0.10 x10*3/uL   Comprehensive Metabolic Panel   Result Value Ref Range    Glucose 109 (H) 74 - 99 mg/dL    Sodium 136 136 - 145 mmol/L    Potassium 5.2 3.5 - 5.3 mmol/L    Chloride 105 98 - 107 mmol/L    Bicarbonate 25 21 - 32 mmol/L    Anion Gap 11 10 - 20 mmol/L    Urea Nitrogen 15 6 - 23 mg/dL    Creatinine 1.24 0.50 - 1.30 mg/dL    eGFR 61 >60 mL/min/1.73m*2    Calcium 10.3 8.6 - 10.3 mg/dL    Albumin 4.1 3.4 - 5.0 g/dL    Alkaline Phosphatase 88 33 - 136 U/L    Total Protein 7.9 6.4 - 8.2 g/dL    AST 22 9 - 39 U/L    Bilirubin, Total 0.3 0.0 - 1.2 mg/dL    ALT 10 10 - 52 U/L   Magnesium   Result Value Ref Range    Magnesium 2.28 1.60 - 2.40 mg/dL   Protime-INR   Result Value Ref Range    Protime 12.1 9.8 - 12.8 seconds    INR 1.1 0.9 - 1.1   Troponin I, High Sensitivity, Initial   Result Value Ref Range    Troponin I, High Sensitivity 8 0 - 20 ng/L   Troponin, High Sensitivity, 1 Hour   Result Value Ref Range    Troponin I, High Sensitivity 9 0 - 20 ng/L   Urinalysis with Reflex Culture and Microscopic   Result Value Ref Range    Color, Urine Yellow Straw, Yellow    Appearance, Urine Clear Clear    Specific Gravity, Urine 1.021 1.005 - 1.035    pH, Urine 5.0 5.0, 5.5, 6.0, 6.5, 7.0, 7.5, 8.0    Protein, Urine NEGATIVE NEGATIVE mg/dL    Glucose, Urine NEGATIVE NEGATIVE mg/dL    Blood, Urine NEGATIVE NEGATIVE    Ketones, Urine NEGATIVE NEGATIVE mg/dL    Bilirubin, Urine NEGATIVE NEGATIVE    Urobilinogen, Urine <2.0 <2.0 mg/dL    Nitrite, Urine NEGATIVE NEGATIVE    Leukocyte Esterase, Urine NEGATIVE NEGATIVE    XR chest 1 view    Result Date: 3/12/2024  STUDY: Chest Radiograph;  03/12/2024 10:12 AM. INDICATION: Chest pain. COMPARISON: None Available. ACCESSION NUMBER(S):  [id number] ORDERING CLINICIAN: MELLY FERNANDO TECHNIQUE:  Frontal chest was obtained at 11:11 hours. FINDINGS: CARDIOMEDIASTINAL SILHOUETTE: Cardiomediastinal silhouette is normal in size and configuration.  LUNGS: Lungs are clear.  ABDOMEN: No remarkable upper abdominal findings.  BONES: No acute osseous changes.    No acute pulmonary pathology. Signed by Stefano Subramanian MD    CT head wo IV contrast    Result Date: 3/12/2024  Interpreted By:  Cindy Britton, STUDY: CT HEAD WO IV CONTRAST;  3/12/2024 10:06 am   INDICATION: Signs/Symptoms:AMS, slumped over at breakfast and was unresponsivee.   COMPARISON: None.   ACCESSION NUMBER(S): [id number]   ORDERING CLINICIAN: MELLY FERNANDO   TECHNIQUE: Axial images of 5 mm thickness were obtained through the head without intravenous contrast sagittal and coronal reconstructions were acquired.   FINDINGS: BRAIN PARENCHYMA: Periventricular and subcortical white matter changes are present.  No masses are seen. No mass effect.  No acute cortical infarct or mass effect is seen.   Large area of encephalomalacia is noted in the right occipital lobe.   HEMORRHAGE: There is no evidence for hemorrhage.   VENTRICLES and EXTRA-AXIAL SPACES: The ventricles, sulci and cisterns are prominent suggesting volume loss.   INTRACRANIAL VESSELS: No significant atherosclerotic calcification.   EXTRACRANIAL SOFT TISSUES: Within normal limits.   PARANASAL SINUSES/MASTOIDS: Within normal limits.   CALVARIUM: No destructive lesion or depressed skull fracture.       Large area of encephalomalacia in the right occipital lobe, which may be related to prior infarct.   Diffuse volume loss and periventricular white matter changes, which given patient's age likely represent small vessel ischemic disease.   No CT evidence of acute hemorrhage or acute cortical infarct.   MACRO: None   Signed by: Cindy Britton 3/12/2024 10:41 AM Dictation workstation:   [id number]      Assessment/Plan   Principal Problem:     Unresponsive episode    Unresponsive/questionable seizure  Neurology is consulted  Seizure precautions  Neurochecks  Swallow eval prior to eating    Coronary artery disease/hypertension/hyperlipidemia  Home medication regimen as appropriate; imdur/lisinopril/hydrochlorothiazide, carvediolol, plavix    Peripheral vascular disease  Continue home medications as appropriate; pletal    COPD  Continue home inhalers  No current issues    CODE STATUS: Patient is full code according to records from [facility].  Patient states his POA is Azucena but there is no Azucena listed and the person listed as his power of  the number is disconnected.  According to Eden Khan, pt does not have POA.      I spent 60 minutes in the professional and overall care of this patient.      Lashonda Sloan, APRN-CNP

## 2024-03-12 NOTE — CONSULTS
Inpatient consult to Neurology  Consult performed by: Vincent Gagnon MD  Consult ordered by: Cortez Hollingsworth DO      History Of Present Illness  Bret Harmon is a 73 y.o. male presenting with an unresponsive episode.  H/o dementia Aox1 at baseline, COPD, weakness, PVD, HTN, HLD, prediabetes, and MDD. Per resident, patient was said to be unresponsive at 0850 for 15 minutes that then self-resolved to patient's baseline. Episode described as a right gaze then upward and unresponsive to verbal or tactile stimuli. He was following commands and able to stand for EMS when they arrived.     Past Medical History  History reviewed. No pertinent past medical history.  Surgical History  History reviewed. No pertinent surgical history.  Social History     Allergies  Bee pollen  (Not in a hospital admission)      Review of Systems   Constitutional:  Negative for chills, fatigue and fever.   HENT:  Negative for congestion, rhinorrhea and sinus pain.    Respiratory:  Negative for cough and shortness of breath.    Cardiovascular:  Negative for chest pain and palpitations.   Gastrointestinal:  Negative for abdominal distention, abdominal pain, diarrhea, nausea and vomiting.     Neurological Exam  Mental Status  Awake and alert. Oriented only to person. Speech is normal. Follows two-step commands.    Cranial Nerves  CN II: Visual acuity is normal. Visual fields full to confrontation.  CN III, IV, VI: Extraocular movements intact bilaterally. Normal lids and orbits bilaterally. Pupils equal round and reactive to light bilaterally.  CN V: Facial sensation is normal.  CN VII: Full and symmetric facial movement.  CN VIII: Hearing is normal.  CN IX, X: Palate elevates symmetrically. Normal gag reflex.  CN XI: Shoulder shrug strength is normal.  CN XII: Tongue midline without atrophy or fasciculations.    Motor  Normal muscle bulk throughout.  Strength 4/5 BL LE, 5/5 BL UE.    Sensory  Sensation is intact to light touch, pinprick, vibration  "and proprioception in all four extremities.    Physical Exam  Constitutional:       General: He is awake.   HENT:      Head: Normocephalic and atraumatic.      Nose: Nose normal.      Mouth/Throat:      Mouth: Mucous membranes are dry.   Eyes:      General: Lids are normal.      Extraocular Movements: Extraocular movements intact.      Pupils: Pupils are equal, round, and reactive to light.   Cardiovascular:      Rate and Rhythm: Normal rate.      Pulses: Normal pulses.   Pulmonary:      Effort: Pulmonary effort is normal. No respiratory distress.   Abdominal:      General: Abdomen is flat. There is no distension.      Palpations: Abdomen is soft.      Tenderness: There is no abdominal tenderness. There is no guarding.   Musculoskeletal:         General: No swelling, tenderness, deformity or signs of injury. Normal range of motion.      Cervical back: Normal range of motion. No rigidity or tenderness.      Right lower leg: No edema.      Left lower leg: No edema.   Skin:     General: Skin is warm and dry.   Neurological:      General: No focal deficit present.      Mental Status: He is alert. Mental status is at baseline.      Cranial Nerves: No cranial nerve deficit.   Psychiatric:         Mood and Affect: Mood normal.         Speech: Speech normal.         Behavior: Behavior normal.       Last Recorded Vitals  Blood pressure 140/67, pulse 74, temperature 36.8 °C (98.2 °F), temperature source Temporal, resp. rate 18, height 1.753 m (5' 9\"), weight 90.7 kg (200 lb), SpO2 100 %.    Relevant Results  CBC, CMP, coags, troponins, and UA unremarkable.                  Chase Coma Scale  Best Eye Response: Spontaneous  Best Verbal Response: Confused  Best Motor Response: Follows commands  Kenyatta Coma Scale Score: 14               CT head wo IV contrast    Result Date: 3/12/2024  Interpreted By:  Cindy Britton, STUDY: CT HEAD WO IV CONTRAST;  3/12/2024 10:06 am   INDICATION: Signs/Symptoms:AMS, slumped over at breakfast " and was unresponsivee.   COMPARISON: None.   ACCESSION NUMBER(S): [id number]   ORDERING CLINICIAN: MELLY FERNANDO   TECHNIQUE: Axial images of 5 mm thickness were obtained through the head without intravenous contrast sagittal and coronal reconstructions were acquired.   FINDINGS: BRAIN PARENCHYMA: Periventricular and subcortical white matter changes are present.  No masses are seen. No mass effect.  No acute cortical infarct or mass effect is seen.   Large area of encephalomalacia is noted in the right occipital lobe.   HEMORRHAGE: There is no evidence for hemorrhage.   VENTRICLES and EXTRA-AXIAL SPACES: The ventricles, sulci and cisterns are prominent suggesting volume loss.   INTRACRANIAL VESSELS: No significant atherosclerotic calcification.   EXTRACRANIAL SOFT TISSUES: Within normal limits.   PARANASAL SINUSES/MASTOIDS: Within normal limits.   CALVARIUM: No destructive lesion or depressed skull fracture.       Large area of encephalomalacia in the right occipital lobe, which may be related to prior infarct.   Diffuse volume loss and periventricular white matter changes, which given patient's age likely represent small vessel ischemic disease.   No CT evidence of acute hemorrhage or acute cortical infarct.   MACRO: None   Signed by: Cindy Britton 3/12/2024 10:41 AM Dictation workstation:   [id number]  .      Assessment/Plan   Principal Problem:    Unresponsive episode      73-year-old male with a history of dementia presenting with a sudden unresponsive episode for 15 minutes that self-resolved. CT head shows encephalomalacia of the right occipital lobe concerning for remote R PCA infarct with presentation concerning for clinical seizure versus fatigue and sleepiness with dementia.      - Baseline EEG strip  - q4hr neuro checks  - MRI w & wo contrast  - antiepileptic treatment & prophylaxis pending findings    I reviewed labs  I reviewed medications  I independently reviewed and interpreted relevant imaging  studies  The patient's neurologic problem poses a threat to brain and/or bodily function     I obtained information from and discussed with an independent historian  I reviewed relevant records  I discussed differential diagnosis, pathogenesis, prognosis, test results and plan with patient   I discussed management with healthcare professionals involved in this patient's care     Case discussed with attending, Dr. Roach  Neurology service  Emergency Medicine PGY-2   Vincent Gagnon MD

## 2024-03-12 NOTE — CONSULTS
Consults  R42.0.  Of unresponsiveness    J44.9 COPD    I10.0 Hypertension    E78.5 Dyslipidemia    F00.3 Dementia    History Of Present Illness:    Bret Harmon is a 73 y.o. male presenting with unresponsive episode at [facility] with myoclonic jerking.  Patient is extremely poor historian due to his dementia and only ER report and records could be relied upon.  He has a history of hypertension hyperlipidemia coronary disease COPD.  He was reportedly unresponsive for 15 minutes at 850 this morning which self resolved.  He is a former smoker he has prediabetes he may have had PAD intervention in the past.  His EKG shows right bundle with normal RI interval.  His medication list shows that he was on Imdur 30 Coreg 25 twice daily no bradycardia pauses or block of been seen on monitor    In the ED CT was negative for acute process potassium was 5 2 BUN 15 creatinine 1.24 hemoglobin 13.7 hematocrit 44 platelet count 322.  His EKG showed normal sinus rhythm with right bundle branch block both troponins have been negative    Review of old records from  as well as care everywhere were noncontributory      Later doing computer research I learned that there were 2 charts on this identical patient and that all of his useful information from the past was in fact in the identical chart which I ascertained    I learned that he is a patient of Dr. Darren Washington has nonischemic cardiomyopathy 40 to 50% nuclear stress test showed inferolateral scar.  He had failed revascularization left leg then PTA intervention on the left SFA he was seen by Dr. Andreina Ayala within the past year for peripheral vascular disease this data is available in the parallel chart which I have asked to be merged to the current chart    Swapnil a 69 year-old male with a history of PAD, dyslipidemia, hypertension, and abnormal ECG (right bundle branch block) here for routine follow-up.      Overall he feels good. He denies any chest pain or shortness of  breath. He denies any claudication symptoms.     Lexiscan nuclear stress test 8/11/17 demonstrating no ECG evidence of stress-induced ischemia. Nuclear evidence of moderate size inferolateral scar with akinesis. No evidence of ischemia. EF 40%.     Echocardiogram 12/16/14 demonstrating normal LV size with EF 50-55%. Mild hypokinesis of the mid anterolateral extending to the apical lateral wall. Normal RV size and function. Mild TR.     Lexiscan nuclear stress test 12/5/14 demonstrating inferolateral scar with no ischemia        *Active Problems   Problems    · Abnormal EKG (794.31) (R94.31)   · Cardiomyopathy, nonischemic (425.4) (I42.8)   · Coronary artery disease (414.00) (I25.10)   · Hypercholesterolemia (272.0) (E78.00)   · Hypertension (401.9) (I10)   · Pre-operative cardiovascular examination (V72.81) (Z01.810)   · Surgical aftercare, circulatory system (V58.73) (Z48.812)     Atherosclerosis of native arteries of extremity with rest pain (440.22) (I70.229)       Arteriosclerosis of arteries of extremities (440.20) (I70.209)       Atherosclerosis of native artery of both lower extremities with intermittent claudication (440.21) (I70.213)           Current Meds     Medication Name Instruction   Atorvastatin Calcium 10 MG Oral Tablet TAKE 1 TABLET DAILY.   Carvedilol 12.5 MG Oral Tablet TAKE 1 TABLET BY MOUTH TWICE DAILY WITH MEALS   hydroCHLOROthiazide 50 MG Oral Tablet TAKE 1 TABLET DAILY.      Allergies  Medication    · No Known Drug Allergies   Recorded By: Paulette Reese; 11/19/2014 1:08:55 PM     Family History  Mother    · Family history of malignant neoplasm (V16.9) (Z80.9)  Father    · Family history of malignant neoplasm (V16.9) (Z80.9)  Sister    · Family history of diabetes mellitus (V18.0) (Z83.3)     Social History  Problems    · Caffeine use (V49.89) (Z78.9)   · Light tobacco smoker (305.1) (F17.200)   ·    · No advance directives (V49.89) (Z78.9)   · Occasional alcohol use   · Tobacco use  "(305.1) (Z72.0)     Review of Systems  Please see the review of systems sheet from today's visit which I have reviewed and have had scanned into the EMR.      Physical Exam  In general: alert and in no acute distress.   HEENT: Mucous membranes moist, carotid upstrokes normal with no bruits. JVP is normal. No cervical lymphadenopathy. Cranial nerves II-XII grossly intact.  Pulmonary: Clear to auscultation bilaterally.  Cardiovascular: S1,S2, regular. No appreciable murmurs, rubs or gallops.   Lower extremities: Warm. 1+ distal pulses. No edema.  Skin: warm and dry. No obvious rashes visualized.  Psychiatric: Oriented to person, place and time. No obvious agitation.           Last Recorded Vitals:  Vitals:    03/12/24 1521 03/12/24 1558 03/12/24 1600 03/12/24 1709   BP: 140/67 167/73 167/73 149/65   BP Location:       Patient Position: Standing      Pulse: 74 67 64 64   Resp:  18 13 16   Temp:    36.6 °C (97.9 °F)   TempSrc:       SpO2:  98% 99% 97%   Weight:       Height:    1.803 m (5' 11\")       Last Labs:  CBC - 3/12/2024:  9:51 AM  5.6 13.7 322    44.0      CMP - 3/12/2024:  9:51 AM  10.3 7.9 22 --- 0.3   _ 4.1 10 88      PTT - No results in last year.  1.1   12.1 _     Troponin I, High Sensitivity   Date/Time Value Ref Range Status   03/12/2024 11:18 AM 9 0 - 20 ng/L Final   03/12/2024 09:51 AM 8 0 - 20 ng/L Final      Last I/O:  I/O last 3 completed shifts:  In: - (0 mL/kg)   Out: 350 (3.9 mL/kg) [Urine:350 (0.1 mL/kg/hr)]  Weight: 90.7 kg     Past Cardiology Tests (Last 3 Years):  EKG:  No results found for this or any previous visit from the past 1095 days.    Echo:  No results found for this or any previous visit from the past 1095 days.    Ejection Fractions:  No results found for: \"EF\"  Cath:  No results found for this or any previous visit from the past 1095 days.    Stress Test:  No results found for this or any previous visit from the past 1095 days.    Cardiac Imaging:  No results found for this or any " previous visit from the past 1095 days.      Past Medical History:  He has a past medical history of COPD (chronic obstructive pulmonary disease) (CMS/[facility]), Coronary artery disease, Dementia (CMS/[facility]), Depression, Dysphagia, Hyperlipidemia, Hypertension, Insomnia, Muscle weakness, Peripheral vascular disease (CMS/[facility]), Prediabetes, and Protein calorie malnutrition (CMS/[facility]).    Past Surgical History:  He has no past surgical history on file.      Social History:  He reports that he has quit smoking. His smoking use included cigarettes. He does not have any smokeless tobacco history on file. He reports that he does not currently use alcohol. He reports that he does not use drugs.    Family History:  Family History   Family history unknown: Yes        Allergies:  Bee pollen    Inpatient Medications:  Scheduled medications   Medication Dose Route Frequency    aspirin  81 mg oral Daily    atorvastatin  40 mg oral Nightly    carvedilol  25 mg oral BID with meals    cilostazol  100 mg oral BID    clopidogrel  75 mg oral Daily    cyanocobalamin  250 mcg oral Daily    enoxaparin  40 mg subcutaneous q24h    FLUoxetine  10 mg oral Daily    ipratropium  0.5 mg nebulization q6h    isosorbide mononitrate ER  30 mg oral Daily    [START ON 3/13/2024] lisinopril  2.5 mg oral Daily    melatonin  3 mg oral Nightly    [START ON 3/13/2024] pantoprazole  20 mg oral Daily before breakfast    perflutren lipid microspheres  0.5-10 mL of dilution intravenous Once in imaging    perflutren protein A microsphere  0.5 mL intravenous Once in imaging    rivastigmine  1.5 mg oral BID     PRN medications   Medication    albuterol    nitroglycerin     Continuous Medications   Medication Dose Last Rate     Outpatient Medications:  Current Outpatient Medications   Medication Instructions    aspirin 81 mg, oral, Daily    atorvastatin (LIPITOR) 40 mg, oral, Daily    carvedilol (COREG) 25 mg, oral, 2 times daily with meals    cilostazol (PLETAL) 100 mg,  oral, 2 times daily    clopidogrel (PLAVIX) 75 mg, oral, Daily    cyanocobalamin (VITAMIN B-12) 250 mcg, oral, Daily    FLUoxetine (PROZAC) 10 mg, oral, Daily    isosorbide mononitrate ER (IMDUR) 30 mg, oral, Daily, Do not crush or chew.    lisinopriL-hydrochlorothiazide 20-12.5 mg tablet 1 tablet, oral, Daily    melatonin 3 mg, oral, Nightly    nitroglycerin (NITROSTAT) 0.4 mg, sublingual, Every 5 min PRN    pantoprazole (PROTONIX) 20 mg, oral, Daily before breakfast, Do not crush, chew, or split.    rivastigmine (EXELON) 1.5 mg, oral, 2 times daily    tiotropium (Spiriva Respimat) 2.5 mcg/actuation inhaler 2 puffs, inhalation, Daily     Results for orders placed or performed during the hospital encounter of 03/12/24 (from the past 96 hour(s))   CBC and Auto Differential   Result Value Ref Range    WBC 5.6 4.4 - 11.3 x10*3/uL    nRBC 0.0 0.0 - 0.0 /100 WBCs    RBC 4.81 4.50 - 5.90 x10*6/uL    Hemoglobin 13.7 13.5 - 17.5 g/dL    Hematocrit 44.0 41.0 - 52.0 %    MCV 92 80 - 100 fL    MCH 28.5 26.0 - 34.0 pg    MCHC 31.1 (L) 32.0 - 36.0 g/dL    RDW 14.3 11.5 - 14.5 %    Platelets 322 150 - 450 x10*3/uL    Neutrophils % 55.6 40.0 - 80.0 %    Immature Granulocytes %, Automated 0.2 0.0 - 0.9 %    Lymphocytes % 34.5 13.0 - 44.0 %    Monocytes % 7.0 2.0 - 10.0 %    Eosinophils % 1.8 0.0 - 6.0 %    Basophils % 0.9 0.0 - 2.0 %    Neutrophils Absolute 3.12 1.60 - 5.50 x10*3/uL    Immature Granulocytes Absolute, Automated 0.01 0.00 - 0.50 x10*3/uL    Lymphocytes Absolute 1.93 0.80 - 3.00 x10*3/uL    Monocytes Absolute 0.39 0.05 - 0.80 x10*3/uL    Eosinophils Absolute 0.10 0.00 - 0.40 x10*3/uL    Basophils Absolute 0.05 0.00 - 0.10 x10*3/uL   Comprehensive Metabolic Panel   Result Value Ref Range    Glucose 109 (H) 74 - 99 mg/dL    Sodium 136 136 - 145 mmol/L    Potassium 5.2 3.5 - 5.3 mmol/L    Chloride 105 98 - 107 mmol/L    Bicarbonate 25 21 - 32 mmol/L    Anion Gap 11 10 - 20 mmol/L    Urea Nitrogen 15 6 - 23 mg/dL     Creatinine 1.24 0.50 - 1.30 mg/dL    eGFR 61 >60 mL/min/1.73m*2    Calcium 10.3 8.6 - 10.3 mg/dL    Albumin 4.1 3.4 - 5.0 g/dL    Alkaline Phosphatase 88 33 - 136 U/L    Total Protein 7.9 6.4 - 8.2 g/dL    AST 22 9 - 39 U/L    Bilirubin, Total 0.3 0.0 - 1.2 mg/dL    ALT 10 10 - 52 U/L   Magnesium   Result Value Ref Range    Magnesium 2.28 1.60 - 2.40 mg/dL   Protime-INR   Result Value Ref Range    Protime 12.1 9.8 - 12.8 seconds    INR 1.1 0.9 - 1.1   Troponin I, High Sensitivity, Initial   Result Value Ref Range    Troponin I, High Sensitivity 8 0 - 20 ng/L   Troponin, High Sensitivity, 1 Hour   Result Value Ref Range    Troponin I, High Sensitivity 9 0 - 20 ng/L   Urinalysis with Reflex Culture and Microscopic   Result Value Ref Range    Color, Urine Yellow Straw, Yellow    Appearance, Urine Clear Clear    Specific Gravity, Urine 1.021 1.005 - 1.035    pH, Urine 5.0 5.0, 5.5, 6.0, 6.5, 7.0, 7.5, 8.0    Protein, Urine NEGATIVE NEGATIVE mg/dL    Glucose, Urine NEGATIVE NEGATIVE mg/dL    Blood, Urine NEGATIVE NEGATIVE    Ketones, Urine NEGATIVE NEGATIVE mg/dL    Bilirubin, Urine NEGATIVE NEGATIVE    Urobilinogen, Urine <2.0 <2.0 mg/dL    Nitrite, Urine NEGATIVE NEGATIVE    Leukocyte Esterase, Urine NEGATIVE NEGATIVE       Physical Exam:  Subjective:   Examination:   General Examination:   General Appearance: Well developed, well nourished, in no acute distress.   Head: normocephalic, atraumatic   Eyes: Anicteric sclera. Pupils are equally round and reactive to light.  Extraocular movements are intact.    Ears: normal   Oral: Cavity: mucosa moist.   Throat: clear.   Neck/Thyroid: neck supple, full range of motion, no cervical lymphadenopathy.   Skin: warm and dry, no suspicious lesions.    Heart: regular rate and rhythm, S1, S2 normal, no murmurs.   Lungs: clear to auscultation bilaterally.   Abdomen: soft, non-tender, non-distended, bowl sound present, normal.   Extremities: no clubbing, no cyanosis, no edema.    Neuro: non-focal, motor strength normal upper lower extremities, sensory exam intact.       Assessment/Plan      R42.0.  Of unresponsiveness    J44.9 COPD    I10.0 Hypertension    E78.5 Dyslipidemia    F00.3 Dementia      Code Status:  No Order    I spent 35 minutes in the professional and overall care of this patient.        Seth Rodriguez MD

## 2024-03-13 ENCOUNTER — APPOINTMENT (OUTPATIENT)
Dept: CARDIOLOGY | Facility: HOSPITAL | Age: 74
End: 2024-03-13
Payer: MEDICARE

## 2024-03-13 ENCOUNTER — HOSPITAL ENCOUNTER (OUTPATIENT)
Dept: NEUROLOGY | Facility: HOSPITAL | Age: 74
Setting detail: OBSERVATION
Discharge: HOME | End: 2024-03-13
Payer: MEDICARE

## 2024-03-13 LAB
AORTIC VALVE PEAK VELOCITY: 1.01 M/S
AV PEAK GRADIENT: 4.1 MMHG
AVA (PEAK VEL): 4.32 CM2
EJECTION FRACTION APICAL 4 CHAMBER: 45.9
EJECTION FRACTION: 52 %
HOLD SPECIMEN: NORMAL
LEFT VENTRICLE INTERNAL DIMENSION DIASTOLE: 3.7 CM (ref 3.5–6)
LEFT VENTRICULAR OUTFLOW TRACT DIAMETER: 2.3 CM
MITRAL VALVE E/A RATIO: 0.52
MITRAL VALVE E/E' RATIO: 8.31
RIGHT VENTRICLE FREE WALL PEAK S': 6.89 CM/S
RIGHT VENTRICLE PEAK SYSTOLIC PRESSURE: 17.5 MMHG
TRICUSPID ANNULAR PLANE SYSTOLIC EXCURSION: 1.7 CM

## 2024-03-13 PROCEDURE — 2500000001 HC RX 250 WO HCPCS SELF ADMINISTERED DRUGS (ALT 637 FOR MEDICARE OP): Performed by: NURSE PRACTITIONER

## 2024-03-13 PROCEDURE — 2500000002 HC RX 250 W HCPCS SELF ADMINISTERED DRUGS (ALT 637 FOR MEDICARE OP, ALT 636 FOR OP/ED): Mod: MUE | Performed by: NURSE PRACTITIONER

## 2024-03-13 PROCEDURE — 2500000001 HC RX 250 WO HCPCS SELF ADMINISTERED DRUGS (ALT 637 FOR MEDICARE OP): Performed by: INTERNAL MEDICINE

## 2024-03-13 PROCEDURE — 93306 TTE W/DOPPLER COMPLETE: CPT

## 2024-03-13 PROCEDURE — 99232 SBSQ HOSP IP/OBS MODERATE 35: CPT

## 2024-03-13 PROCEDURE — 2500000004 HC RX 250 GENERAL PHARMACY W/ HCPCS (ALT 636 FOR OP/ED): Performed by: NURSE PRACTITIONER

## 2024-03-13 PROCEDURE — G0378 HOSPITAL OBSERVATION PER HR: HCPCS

## 2024-03-13 PROCEDURE — 2500000002 HC RX 250 W HCPCS SELF ADMINISTERED DRUGS (ALT 637 FOR MEDICARE OP, ALT 636 FOR OP/ED): Mod: MUE | Performed by: INTERNAL MEDICINE

## 2024-03-13 PROCEDURE — 94640 AIRWAY INHALATION TREATMENT: CPT | Mod: MUEWO

## 2024-03-13 PROCEDURE — 95816 EEG AWAKE AND DROWSY: CPT

## 2024-03-13 PROCEDURE — 92610 EVALUATE SWALLOWING FUNCTION: CPT | Mod: GN | Performed by: SPEECH-LANGUAGE PATHOLOGIST

## 2024-03-13 PROCEDURE — 2500000004 HC RX 250 GENERAL PHARMACY W/ HCPCS (ALT 636 FOR OP/ED): Performed by: INTERNAL MEDICINE

## 2024-03-13 PROCEDURE — 94664 DEMO&/EVAL PT USE INHALER: CPT

## 2024-03-13 PROCEDURE — 95816 EEG AWAKE AND DROWSY: CPT | Performed by: PSYCHIATRY & NEUROLOGY

## 2024-03-13 RX ADMIN — RIVASTIGMINE TARTRATE 1.5 MG: 1.5 CAPSULE ORAL at 08:59

## 2024-03-13 RX ADMIN — CILOSTAZOL 100 MG: 100 TABLET ORAL at 21:18

## 2024-03-13 RX ADMIN — PERFLUTREN 2 ML OF DILUTION: 6.52 INJECTION, SUSPENSION INTRAVENOUS at 14:54

## 2024-03-13 RX ADMIN — FLUOXETINE 10 MG: 10 CAPSULE ORAL at 08:59

## 2024-03-13 RX ADMIN — IPRATROPIUM BROMIDE 0.5 MG: 0.5 SOLUTION RESPIRATORY (INHALATION) at 20:32

## 2024-03-13 RX ADMIN — PANTOPRAZOLE SODIUM 20 MG: 20 TABLET, DELAYED RELEASE ORAL at 06:22

## 2024-03-13 RX ADMIN — CYANOCOBALAMIN TAB 500 MCG 250 MCG: 500 TAB at 08:59

## 2024-03-13 RX ADMIN — CILOSTAZOL 100 MG: 100 TABLET ORAL at 09:00

## 2024-03-13 RX ADMIN — ENOXAPARIN SODIUM 40 MG: 40 INJECTION SUBCUTANEOUS at 18:58

## 2024-03-13 RX ADMIN — ISOSORBIDE MONONITRATE 30 MG: 30 TABLET, EXTENDED RELEASE ORAL at 08:59

## 2024-03-13 RX ADMIN — CARVEDILOL 25 MG: 25 TABLET, FILM COATED ORAL at 16:51

## 2024-03-13 RX ADMIN — CLOPIDOGREL BISULFATE 75 MG: 75 TABLET ORAL at 08:59

## 2024-03-13 RX ADMIN — Medication 3 MG: at 21:18

## 2024-03-13 RX ADMIN — ASPIRIN 81 MG 81 MG: 81 TABLET ORAL at 08:59

## 2024-03-13 RX ADMIN — LISINOPRIL 2.5 MG: 2.5 TABLET ORAL at 08:59

## 2024-03-13 RX ADMIN — CARVEDILOL 25 MG: 25 TABLET, FILM COATED ORAL at 08:59

## 2024-03-13 RX ADMIN — IPRATROPIUM BROMIDE 0.5 MG: 0.5 SOLUTION RESPIRATORY (INHALATION) at 09:13

## 2024-03-13 RX ADMIN — ATORVASTATIN CALCIUM 40 MG: 40 TABLET, FILM COATED ORAL at 21:18

## 2024-03-13 RX ADMIN — RIVASTIGMINE TARTRATE 1.5 MG: 1.5 CAPSULE ORAL at 21:18

## 2024-03-13 ASSESSMENT — PAIN SCALES - GENERAL
PAINLEVEL_OUTOF10: 0 - NO PAIN

## 2024-03-13 ASSESSMENT — COGNITIVE AND FUNCTIONAL STATUS - GENERAL
EATING MEALS: A LITTLE
DRESSING REGULAR UPPER BODY CLOTHING: A LOT
DRESSING REGULAR LOWER BODY CLOTHING: A LOT
MOBILITY SCORE: 17
WALKING IN HOSPITAL ROOM: A LITTLE
HELP NEEDED FOR BATHING: A LOT
TOILETING: A LOT
MOVING TO AND FROM BED TO CHAIR: A LITTLE
TURNING FROM BACK TO SIDE WHILE IN FLAT BAD: A LITTLE
PERSONAL GROOMING: A LOT
STANDING UP FROM CHAIR USING ARMS: A LITTLE
CLIMB 3 TO 5 STEPS WITH RAILING: TOTAL
DAILY ACTIVITIY SCORE: 13

## 2024-03-13 ASSESSMENT — VISUAL ACUITY: VA_NORMAL: 1

## 2024-03-13 ASSESSMENT — PAIN - FUNCTIONAL ASSESSMENT
PAIN_FUNCTIONAL_ASSESSMENT: 0-10

## 2024-03-13 NOTE — PROGRESS NOTES
"Speech-Language Pathology    SLP Adult Inpatient Speech-Language Pathology Clinical Swallow Evaluation    Patient Name: Bret Harmon  MRN: [id number]  Today's Date: 3/13/2024   Time Calculation  Start Time: 1635  Stop Time: 1645  Time Calculation (min): 10 min         Current Problem:   1. Unresponsive episode  EEG    Transthoracic Echo (TTE) Complete    Transthoracic Echo (TTE) Complete    CANCELED: Transthoracic Echo (TTE) Complete    CANCELED: Transthoracic Echo (TTE) Complete            Recommendations:  Risk for Aspiration: Other (Comment) (yes due to baseline of dementia, however not highly suspected per this assessment or imaging completed this admission)  Solid Diet Recommendations : Regular (IDDSI Level 7)  Liquid Diet Recommendations: Thin (IDDSI Level 0)  Compensatory Swallowing Strategies: Upright 90 degrees as possible for all oral intake, Remain upright for 20-30 minutes after meals, Small bites/sips, Eat/feed slowly  Medication Administration Recommendations: Other (Comment) (as best tolerated)      Assessment:  Assessment Results:   Patient presents with suspected functional oropharyngeal swallow upon completion of clinical swallow evaluation at bedside this date. Examination of oral mechanism was remarkable for decreased lingual strength and agility. Patient did not \"pass\" [place] Swallow Protocol (3 oz water challenge) due to stopping prior to completion; however, patient tolerated PO trials of thin liquids via cup, pureed solids, soft solids, and regular solids absent of overt s/s of aspiration. Patient with mildly prolonged but functional mastication, in the absence of bottom dentures.    Per this assessment, patient is appropriate to continue baseline diet with standard aspiration precautions. Patient is at some risk for aspiration due to baseline of dementia; however, chest XR upon admission was clear and CT Head did not reveal concern for any acute neurological changes.    ST to sign off, but remains " "available upon re-consultation should new concerns arise.    Treatment Provided: No      Plan:  Inpatient/Swing Bed or Outpatient: Inpatient  SLP Plan: No skilled SLP  No Skilled SLP: At baseline function, Independent with swallowing  SLP Discharge Recommendations: D/C as patient is functional for this level of care  SLP - OK to Discharge: Yes      Subjective   Current Problem:  Patient denied dysphagia, globus sensation, or coughing/choking with PO intake.      General Visit Information:  Patient Class: Inpatient  Living Environment: Nursing home (skilled/long-term)  Caregiver Feedback: RN reported patient ate breakfast sitting up in chair, and then took a nap through lunch but would receive a tray when he returned from test.  Ordering Physician: ASHA Brewer  Reason for Referral: Swallow evaluation, determine if MBS is needed  Past Medical History Relevant to Rehab: dementia, HTN, HLD, COPD, GERD  Prior Level of Function: Decreased function  Patient Seen During This Visit: Yes  Total Number of Visits : 1  Prior to Session Communication: Bedside nurse  Date of Onset: 03/12/24  Date of Order: 03/12/24  [employer] Diet: Regular solids and thin liquids, per transfer paperwork from facility  Current Diet : Regular solids and thin liquids  Dysphagia Diagnosis: Other (Comment) (suspected functional oropharyngeal swallow)      Objective       Baseline Assessment:  Respiratory Status: Room air  History of Intubation: No  Behavior/Cognition: Alert, Cooperative  Patient Positioning: Upright in Chair  Baseline Vocal Quality: Normal  Volitional Cough: Strong  Volitional Swallow: Within Functional Limits    Relevant Imaging:    Chest XR 3/12/24: \"IMPRESSION:  No acute pulmonary pathology.\"    CT Head 3/12/24: \"IMPRESSION:  Large area of encephalomalacia in the right occipital lobe, which may  be related to prior infarct.  Diffuse volume loss and periventricular white matter changes, which  given patient's age " "likely represent small vessel ischemic disease.  No CT evidence of acute hemorrhage or acute cortical infarct. \"      Pain:  Pain Assessment: 0-10  Pain Score: 0 - No pain       Oral/Motor Assessment:  Oral Hygiene: Good  Dentition: Dentures (Upper Full), Other (Comment) (also wears lower dentures, but they are not in the hospital)  Oral Motor: Impaired Function  Lingual Agility: Reduced  Lingual Strength: Reduced  Apraxia: None present  Intelligibility: Intelligible  Breath Support: Adequate for speech      Consistencies Trialed:  Consistencies Trialed: Yes  Consistencies Trialed: Thin (IDDSI Level 0) - Straw, Pureed/extremely thick (IDDSI Level 4), Soft & bite sized/chopped (IDDSI Level 6), Regular (IDDSI Level 7)      Clinical Observations:  Patient Positioning: Upright in Chair  Management of Oral Secretions: Adequate  Was The 3 oz Swallow Protocol Completed: Yes, Other (Comment) (did not \"pass\" due to stopping prior to completion)    Inpatient:    Education:  Learner patient   Barriers to Learning cognitive limitations barrier   Method demonstration; verbal   Education - Topic ST provided patient education regarding role of ST, purpose of assessment, clinical impressions, and recommendations. Patient verbalized questionable full comprehension, consistent with cognitive status. ST further coordinated with RN regarding recommendations per this assessment, with RN verbalizing understanding.     Outcome    Needs review/reinforcement       "

## 2024-03-13 NOTE — PROGRESS NOTES
Occupational Therapy                 Therapy Communication Note    Patient Name: Bret Harmon  MRN: [id number]  Today's Date: 3/13/2024     Discipline: Occupational Therapy    Comment: Received orders for OT eval 3/12. Completed chart review, and pt is a LTC resident at [facility] with plans to return. Pt does not have any OT needs at this time. Will discharged OT services.

## 2024-03-13 NOTE — CARE PLAN
The patient's goals for the shift include NO FALLS    The clinical goals for the shift include PT. WILL HAVE NO MORE EPISODES OF UNRESPONSIVENESS

## 2024-03-13 NOTE — PROGRESS NOTES
"INTERNAL MEDICINE PROGRESS NOTE    PATIENT NAME: Bret Harmon  MRN: [id number]  SERVICE DATE:  March 13, 2024  SERVICE TIME:  10:18 AM    ATTENDING PHYSICIAN:  Melly Morrow MD    SUBJECTIVE    INTERVAL HPI:  No overnight events.  Patient resting comfortably in chair at time of my evaluation.  He has no complaints.  Remains alert and oriented to self and place (knows he is in the hospital although misidentifies hospital as [facility]).  Does not know the year.  He continues to deny chest pain, shortness of breath, lightheadedness, dizziness.  EEG, brain MRI, echo not yet  performed.    REVIEW OF SYSTEMS:  A 4-point review of systems was performed and negative except per HPI.    MEDICATIONS: Reviewed    PAST MEDICAL HISTORY:    Past Medical History:   Diagnosis Date    COPD (chronic obstructive pulmonary disease) (CMS/HCC)     Coronary artery disease     Dementia (CMS/HCC)     Depression     Dysphagia     Hyperlipidemia     Hypertension     Insomnia     Muscle weakness     Peripheral vascular disease (CMS/HCC)     Prediabetes     Protein calorie malnutrition (CMS/HCC)        OBJECTIVE  PHYSICAL EXAM:  /58 (BP Location: Left arm, Patient Position: Lying)   Pulse 65   Temp 36 °C (96.8 °F) (Temporal)   Resp 16   Ht 1.803 m (5' 11\")   Wt 90.7 kg (200 lb)   SpO2 98%   BMI 27.89 kg/m²   GENERAL: No acute distress.  EYES: Extraocular movements intact.  No scleral icterus.  EARS:  External ears normal.  HEAD: Normocephalic and atraumatic.  NECK: Supple neck.  HEART: Regular rate.      LUNGS: Chest clear to auscultation bilaterally.    ABDOMEN: Soft, non-tender, non-distended.      EXTREMITIES: No cyanosis or edema.  SKIN: Warm and dry.  No rashes.    NEUROLOGIC: Alert and oriented x2.    PSYCHIATRIC: Appropriate mood and affect.    DATA:  Diagnostic tests reviewed for today's visit:  Most recent labs and imaging results.    ASSESSMENT AND PLAN    Unresponsive episode, resolved  Unclear etiology of " unresponsive episode prior to admission.  He has returned to baseline and has no complaints presently.  No metabolic abnormalities seen on admission labs.  CT head no acute intracranial pathology.  No evidence of arrhythmia or acute ischemia on EKG; troponins negative.  Orthostatic vital signs negative in ER.      Cannot rule out seizure and appreciate neurology consultation. It is also possible that patient simply fell asleep while eating earlier and was difficult to arouse.       Neurology has evaluated patient and recommend EEG and MRI brain with and without contrast.  Await performance of these studies.  Maintain seizure precautions.  Additionally appreciate cardiology evaluation given patient's cardiovascular history including CAD and PAD.  Echocardiogram ordered (not yet performed).  Maintain telemetry to monitor for arrhythmia.       History of CAD  History of PAD   Continue home daily baby aspirin, Plavix 75 mg daily, cilostazol 100 mg twice daily, Lipitor 40 mg daily, carvedilol 25 mg twice daily, lisinopril 2.5 mg daily, Imdur 30 mg daily.     History of depression  Continue home Prozac 10 mg daily     Dementia  Mentation currently at baseline.  Maintain delirium precautions. Continue home rivastigmine 1.5 mg twice daily.     COPD  Lungs clear on exam.  Continue home Spiriva (substituted per pharmacy protocol).  Albuterol nebs ordered as needed.     History of GERD  Continue home Protonix 20 mg daily     VTE prophylaxis  Subcutaneous Lovenox ordered     Disposition planning:  to be determined; anticipate return to [facility] when medically ready     Plan of care discussed with: Provider, RN, Patient, neurology resident physician    SIGNATURE: Melly Morrow MD  March 13, 2024  10:18 AM

## 2024-03-13 NOTE — PROGRESS NOTES
Speech-Language Pathology                 Therapy Communication Note    Patient Name: Bret Harmon  MRN: [id number]  Today's Date: 3/13/2024     Discipline: Speech Language Pathology    Missed Visit Reason:  Test/procedure    Missed Time: Attempt at 1425    Comment: Patient off of floor for echo at time of attempted visit for clinical swallow evaluation. Will plan to re-attempt at next availability.

## 2024-03-13 NOTE — PROGRESS NOTES
Spiritual Care Visit    Clinical Encounter Type  Visited With: Patient  Routine Visit: Introduction  Continue Visiting: No                                            Taxonomy  Intended Effects: Preserve dignity and respect, Promote sense of peace, Leia affirmation  Methods: Offer spiritual/Uatsdin support  Interventions: Share words of hope and inspiration, [place]    Patient extended his hand for the  and the  shook his hand.  Patient said he goes to [employer] but could not remember the name of his [employer].  Patient was slow to respond to the 's questions.   spoke encouraging words and prayed at his request.

## 2024-03-13 NOTE — PROGRESS NOTES
"Bret Harmon is a 73 y.o. male on day 0 of admission presenting with Unresponsive episode.      Subjective   NAEON. Patient at bedside eating breakfast. No episodes of unresponsiveness noted. Patient has no complaints.       Objective     Last Recorded Vitals  Blood pressure 114/58, pulse 65, temperature 36 °C (96.8 °F), temperature source Temporal, resp. rate 16, height 1.803 m (5' 11\"), weight 90.7 kg (200 lb), SpO2 98 %.    Physical Exam  Vitals and nursing note reviewed.   Constitutional:       General: He is not in acute distress.     Appearance: He is well-developed.   HENT:      Head: Normocephalic and atraumatic.      Right Ear: External ear normal.      Left Ear: External ear normal.      Nose: Nose normal. No congestion or rhinorrhea.      Mouth/Throat:      Mouth: Mucous membranes are moist.      Pharynx: No oropharyngeal exudate or posterior oropharyngeal erythema.   Eyes:      General: Lids are normal.         Right eye: No discharge.         Left eye: No discharge.      Extraocular Movements: Extraocular movements intact.      Conjunctiva/sclera: Conjunctivae normal.      Pupils: Pupils are equal, round, and reactive to light.   Cardiovascular:      Rate and Rhythm: Normal rate and regular rhythm.      Pulses: Normal pulses.      Heart sounds: No murmur heard.     No friction rub.   Pulmonary:      Effort: Pulmonary effort is normal. No respiratory distress.      Breath sounds: Normal breath sounds. No stridor. No wheezing or rales.   Abdominal:      General: There is no distension.      Palpations: Abdomen is soft.      Tenderness: There is no abdominal tenderness. There is no right CVA tenderness, left CVA tenderness or guarding.   Musculoskeletal:         General: No swelling, tenderness, deformity or signs of injury. Normal range of motion.      Cervical back: Neck supple.      Right lower leg: No edema.      Left lower leg: No edema.   Skin:     General: Skin is warm and dry.      Capillary Refill: " Capillary refill takes less than 2 seconds.      Coloration: Skin is not jaundiced or pale.      Findings: No lesion or rash.   Neurological:      General: No focal deficit present.      Mental Status: He is alert. Mental status is at baseline.      Cranial Nerves: No cranial nerve deficit.      Sensory: No sensory deficit.      Motor: Motor strength is normal.No weakness.   Psychiatric:         Mood and Affect: Mood normal.         Behavior: Behavior normal.         Thought Content: Thought content normal.         Judgment: Judgment normal.       Neurological Exam  Mental Status  Alert. Oriented only to person. Language is fluent with no aphasia.    Cranial Nerves  CN II: Visual acuity is normal. Visual fields full to confrontation.  CN III, IV, VI: Extraocular movements intact bilaterally. Normal lids and orbits bilaterally. Pupils equal round and reactive to light bilaterally.  CN V: Facial sensation is normal.  CN VII: Full and symmetric facial movement.  CN VIII: Hearing is normal.  CN IX, X: Palate elevates symmetrically. Normal gag reflex.  CN XI: Shoulder shrug strength is normal.  CN XII: Tongue midline without atrophy or fasciculations.    Motor  Normal muscle bulk throughout. Normal muscle tone. Strength is 5/5 throughout all four extremities.    Sensory  Sensation is intact to light touch, pinprick, vibration and proprioception in all four extremities.    Relevant Results                    Arbon Coma Scale  Best Eye Response: Spontaneous  Best Verbal Response: Confused  Best Motor Response: Follows commands  Kenyatta Coma Scale Score: 14                       Assessment/Plan      Principal Problem:    Unresponsive episode    -no urgent need for MRI if unable to perform screening. Indicated should a repeated episode of unresponsiveness occur  -EEG pending for discharge  -continue neuro checks q4h          I reviewed labs  I reviewed medications  I independently reviewed and interpreted relevant imaging  studies  The patient's neurologic problem poses a threat to brain and/or bodily function     I obtained information from and discussed with an independent historian  I reviewed relevant records  I discussed differential diagnosis, pathogenesis, prognosis, test results and plan with patient   I discussed management with healthcare professionals involved in this patient's care     Case discussed with attending, Dr. Roach  Neurology service  Emergency Medicine PGY-2   Vincent Gagnon MD

## 2024-03-13 NOTE — PROGRESS NOTES
Physical Therapy                 Therapy Communication Note    Patient Name: Bret Harmon  MRN: [id number]  Today's Date: 3/13/2024     Discipline: Physical Therapy    Missed Time: Attempt    Comment:  PT orders received, chart reviewed.  Per [facility], pt is LTC resident at [facility] with plans to return upon DC from the hospital.  Pt with no acute skilled therapy needs at this time.  Will DC PT orders.

## 2024-03-13 NOTE — CARE PLAN
The patient's goals for the shift include NO FALLS    The clinical goals for the shift include PT WILL USE THE CALL LIGHT WHEN ASSISTANCE IS NEEDED TO PREVENT FALLS. ALL ALARMS ARE AUDIBLE

## 2024-03-13 NOTE — CARE PLAN
The patient's goals for the shift include NO FALLS    The clinical goals for the shift include PT. WILL HAVE NO MORE EPISODES OF UNRESPONSIVENESS    Problem: Pain  Goal: My pain/discomfort is manageable  Outcome: Progressing     Problem: Safety  Goal: Patient will be injury free during hospitalization  Outcome: Progressing  Goal: I will remain free of falls  Outcome: Progressing     Problem: Psychosocial Needs  Goal: Demonstrates ability to cope with hospitalization/illness  Outcome: Progressing  Goal: Collaborate with me, my family, and caregiver to identify my specific goals  Outcome: Progressing     Problem: Fall/Injury  Goal: Not fall by end of shift  Outcome: Progressing  Goal: Be free from injury by end of the shift  Outcome: Progressing  Goal: Verbalize understanding of personal risk factors for fall in the hospital  Outcome: Progressing  Goal: Verbalize understanding of risk factor reduction measures to prevent injury from fall in the home  Outcome: Progressing  Goal: Use assistive devices by end of the shift  Outcome: Progressing     Problem: Seizures  Goal: Absence or minimized seizure activity  Outcome: Progressing  Goal: Freedom from injury  Outcome: Progressing  Goal: Intact skin surrounding leads  Outcome: Progressing  Goal: No signs of respiratory or cardiac compromise  Outcome: Progressing  Goal: Protection of airway  Outcome: Progressing

## 2024-03-14 VITALS
HEART RATE: 76 BPM | DIASTOLIC BLOOD PRESSURE: 56 MMHG | WEIGHT: 200 LBS | SYSTOLIC BLOOD PRESSURE: 109 MMHG | HEIGHT: 71 IN | TEMPERATURE: 97.7 F | OXYGEN SATURATION: 98 % | RESPIRATION RATE: 16 BRPM | BODY MASS INDEX: 28 KG/M2

## 2024-03-14 LAB
ANION GAP SERPL CALC-SCNC: 11 MMOL/L (ref 10–20)
BUN SERPL-MCNC: 23 MG/DL (ref 6–23)
CALCIUM SERPL-MCNC: 9.8 MG/DL (ref 8.6–10.3)
CHLORIDE SERPL-SCNC: 104 MMOL/L (ref 98–107)
CO2 SERPL-SCNC: 27 MMOL/L (ref 21–32)
CREAT SERPL-MCNC: 1.31 MG/DL (ref 0.5–1.3)
EGFRCR SERPLBLD CKD-EPI 2021: 57 ML/MIN/1.73M*2
GLUCOSE SERPL-MCNC: 95 MG/DL (ref 74–99)
HOLD SPECIMEN: NORMAL
POTASSIUM SERPL-SCNC: 3.5 MMOL/L (ref 3.5–5.3)
SODIUM SERPL-SCNC: 138 MMOL/L (ref 136–145)

## 2024-03-14 PROCEDURE — 2500000002 HC RX 250 W HCPCS SELF ADMINISTERED DRUGS (ALT 637 FOR MEDICARE OP, ALT 636 FOR OP/ED): Performed by: INTERNAL MEDICINE

## 2024-03-14 PROCEDURE — 2500000001 HC RX 250 WO HCPCS SELF ADMINISTERED DRUGS (ALT 637 FOR MEDICARE OP): Performed by: INTERNAL MEDICINE

## 2024-03-14 PROCEDURE — 36415 COLL VENOUS BLD VENIPUNCTURE: CPT | Performed by: INTERNAL MEDICINE

## 2024-03-14 PROCEDURE — 2500000002 HC RX 250 W HCPCS SELF ADMINISTERED DRUGS (ALT 637 FOR MEDICARE OP, ALT 636 FOR OP/ED): Mod: MUE | Performed by: NURSE PRACTITIONER

## 2024-03-14 PROCEDURE — 2500000001 HC RX 250 WO HCPCS SELF ADMINISTERED DRUGS (ALT 637 FOR MEDICARE OP): Performed by: NURSE PRACTITIONER

## 2024-03-14 PROCEDURE — 94640 AIRWAY INHALATION TREATMENT: CPT

## 2024-03-14 PROCEDURE — 94640 AIRWAY INHALATION TREATMENT: CPT | Mod: MUEWO

## 2024-03-14 PROCEDURE — G0378 HOSPITAL OBSERVATION PER HR: HCPCS

## 2024-03-14 PROCEDURE — 80048 BASIC METABOLIC PNL TOTAL CA: CPT | Performed by: INTERNAL MEDICINE

## 2024-03-14 RX ORDER — LISINOPRIL 2.5 MG/1
2.5 TABLET ORAL DAILY
Start: 2024-03-14

## 2024-03-14 RX ORDER — LISINOPRIL 2.5 MG/1
2.5 TABLET ORAL DAILY
COMMUNITY
End: 2024-03-14 | Stop reason: HOSPADM

## 2024-03-14 RX ADMIN — RIVASTIGMINE TARTRATE 1.5 MG: 1.5 CAPSULE ORAL at 09:25

## 2024-03-14 RX ADMIN — CLOPIDOGREL BISULFATE 75 MG: 75 TABLET ORAL at 09:25

## 2024-03-14 RX ADMIN — ASPIRIN 81 MG 81 MG: 81 TABLET ORAL at 09:25

## 2024-03-14 RX ADMIN — CARVEDILOL 25 MG: 25 TABLET, FILM COATED ORAL at 09:25

## 2024-03-14 RX ADMIN — LISINOPRIL 2.5 MG: 2.5 TABLET ORAL at 09:24

## 2024-03-14 RX ADMIN — CYANOCOBALAMIN TAB 500 MCG 250 MCG: 500 TAB at 09:25

## 2024-03-14 RX ADMIN — ISOSORBIDE MONONITRATE 30 MG: 30 TABLET, EXTENDED RELEASE ORAL at 09:25

## 2024-03-14 RX ADMIN — FLUOXETINE 10 MG: 10 CAPSULE ORAL at 09:25

## 2024-03-14 RX ADMIN — PANTOPRAZOLE SODIUM 20 MG: 20 TABLET, DELAYED RELEASE ORAL at 06:20

## 2024-03-14 RX ADMIN — IPRATROPIUM BROMIDE 0.5 MG: 0.5 SOLUTION RESPIRATORY (INHALATION) at 09:02

## 2024-03-14 RX ADMIN — CILOSTAZOL 100 MG: 100 TABLET ORAL at 09:26

## 2024-03-14 RX ADMIN — IPRATROPIUM BROMIDE 0.5 MG: 0.5 SOLUTION RESPIRATORY (INHALATION) at 12:10

## 2024-03-14 ASSESSMENT — COGNITIVE AND FUNCTIONAL STATUS - GENERAL
TOILETING: A LOT
TURNING FROM BACK TO SIDE WHILE IN FLAT BAD: A LITTLE
DAILY ACTIVITIY SCORE: 12
STANDING UP FROM CHAIR USING ARMS: A LITTLE
EATING MEALS: A LOT
PERSONAL GROOMING: A LOT
DRESSING REGULAR LOWER BODY CLOTHING: A LOT
MOBILITY SCORE: 17
DRESSING REGULAR UPPER BODY CLOTHING: A LOT
MOVING TO AND FROM BED TO CHAIR: A LITTLE
CLIMB 3 TO 5 STEPS WITH RAILING: TOTAL
WALKING IN HOSPITAL ROOM: A LITTLE
HELP NEEDED FOR BATHING: A LOT

## 2024-03-14 ASSESSMENT — PAIN SCALES - GENERAL: PAINLEVEL_OUTOF10: 0 - NO PAIN

## 2024-03-14 NOTE — CARE PLAN
Problem: Pain  Goal: My pain/discomfort is manageable  Outcome: Progressing     Problem: Safety  Goal: Patient will be injury free during hospitalization  Outcome: Progressing  Goal: I will remain free of falls  Outcome: Progressing     Problem: Daily Care  Goal: Daily care needs are met  Outcome: Progressing     Problem: Psychosocial Needs  Goal: Demonstrates ability to cope with hospitalization/illness  Outcome: Progressing  Goal: Collaborate with me, my family, and caregiver to identify my specific goals  Outcome: Progressing     Problem: Discharge Barriers  Goal: My discharge needs are met  Outcome: Progressing     Problem: Fall/Injury  Goal: Not fall by end of shift  Outcome: Progressing  Goal: Be free from injury by end of the shift  Outcome: Progressing  Goal: Verbalize understanding of personal risk factors for fall in the hospital  Outcome: Progressing  Goal: Verbalize understanding of risk factor reduction measures to prevent injury from fall in the home  Outcome: Progressing  Goal: Use assistive devices by end of the shift  Outcome: Progressing  Goal: Pace activities to prevent fatigue by end of the shift  Outcome: Progressing     Problem: Seizures  Goal: Absence or minimized seizure activity  Outcome: Progressing  Goal: Freedom from injury  Outcome: Progressing  Goal: Intact skin surrounding leads  Outcome: Progressing  Goal: No signs of respiratory or cardiac compromise  Outcome: Progressing  Goal: Protection of airway  Outcome: Progressing     Problem: Skin  Goal: Decreased wound size/increased tissue granulation at next dressing change  Outcome: Progressing  Goal: Participates in plan/prevention/treatment measures  Outcome: Progressing  Goal: Prevent/manage excess moisture  Outcome: Progressing  Goal: Prevent/minimize sheer/friction injuries  Outcome: Progressing  Goal: Promote/optimize nutrition  Outcome: Progressing  Goal: Promote skin healing  Outcome: Progressing   The patient's goals for the  shift include NO FALLS    The clinical goals for the shift include Pt will remain free from fall/injury

## 2024-03-14 NOTE — CARE PLAN
The patient's goals for the shift include NO FALLS    The clinical goals for the shift include PT WILL BE FREE OF INJURY THRU THIS SHIFT.  ALL ALARMS ARE AUDIBLE

## 2024-03-14 NOTE — PROGRESS NOTES
03/14/24 0917   Discharge Planning   Patient expects to be discharged to: [facility]   Does the patient need discharge transport arranged? Yes   RoundTrip coordination needed? Yes   Has discharge transport been arranged? No     Spoke to Keesha SAM/sister to confirm patient will be going back to [facility] when medically ready for discharge.     0925: Stretcher transport arranged to [facility] for 1100  time. Patient is a flight risk secondary to dementia. Jerez sherry, MAR, LANETTES, H&P, and consults sent electronically to facility. Facility notified of  time.

## 2024-03-14 NOTE — DISCHARGE SUMMARY
Inpatient Discharge Summary    BRIEF OVERVIEW  Admitting Provider: Melly Morrow MD  Discharge Provider: Melly Morrow MD  Primary Care Physician at Discharge: Shon Ford [phone]     Admission Date: 3/12/2024     Discharge Date: 3/14/2024    Primary Discharge Diagnosis  Unresponsive episode, resolved    Discharge Disposition  Return to [facility]    Discharge Condition:   stable    Discharge Diagnoses and Hospital Course   Mr. Harmon is a delightful 73 y.o.  male PMH CAD, PAD, COPD, dementia (A&O x 1-2 at baseline), hypertension, dyslipidemia, prediabetes, depression sent to ER from [facility] after unresponsive episode that lasted approximately 10 minutes.  Patient had return to his baseline by the time EMS arrived approximately 20 minutes after onset of episode.     ER workup reviewed:  Patient afebrile and hemodynamically stable  CBC and CMP unremarkable  EKG normal sinus rhythm; right bundle branch block; no acute ischemic changes  Troponins negative x 2  CT head no acute intracranial pathology  Chest x-ray unremarkable  Urinalysis negative     Patient was admitted on telemetry with consults to neurology and cardiology given unresponsive episode of unclear etiology prior to admission.    Unresponsive episode, resolved  Unclear etiology of unresponsive episode prior to admission.  He has returned to baseline and has no complaints presently.  No further episodes since admission.  No metabolic abnormalities seen. CT head no acute intracranial pathology.  No evidence of arrhythmia or acute ischemia on EKG or telemetry; troponins negative.  Orthostatic vital signs negative in ER.  Given concern for possible seizure, routine EEG was performed and did not reveal any epileptiform discharges.  MRI brain was ordered however not completed as patient unable to complete the screening form.  Given patient remains at baseline without any further episodes, neurology has  signed off and cleared patient for discharge.  Hold off on AEDs for now.    Note patient was also seen by cardiology given his cardiovascular history.  Echocardiogram was performed which revealed LVEF 45-50%; multiple wall motion abnormalities which are not new.  Case discussed with Dr. Rodriguez this morning who has cleared patient for discharge.  No further cardiac testing recommended.     Given overall unremarkable workup, it is possible patient simply fell asleep at nursing facility prior to admission and was exceptionally difficult to arouse, especially in context of his underlying dementia.    Mildly elevated creatinine, suspect underlying CKD  Creatinine 1.31 today, largely stable from 1.24 on admission.  No prior labs for comparison.  Suspect underlying CKD.       History of CAD  History of PAD   Continue home daily baby aspirin, Plavix 75 mg daily, cilostazol 100 mg twice daily, Lipitor 40 mg daily, carvedilol 25 mg twice daily, lisinopril 2.5 mg daily, Imdur 30 mg daily.     History of depression  Continue home Prozac 10 mg daily     Dementia  Mentation currently at baseline.  Maintain delirium precautions. Continue home rivastigmine 1.5 mg twice daily.     COPD  Lungs clear on exam.  Continue home Spiriva.     History of GERD  Continue home Protonix 20 mg daily     Disposition planning: Patient is medically stable to return to [facility] today.      Plan discussed with patient, nursing, medicine nurse practitioner, , Dr. Rodriguez.         Your medication list        CONTINUE taking these medications        Instructions Last Dose Given Next Dose Due   aspirin 81 mg chewable tablet           atorvastatin 40 mg tablet  Commonly known as: Lipitor           carvedilol 25 mg tablet  Commonly known as: Coreg           cilostazol 100 mg tablet  Commonly known as: Pletal           clopidogrel 75 mg tablet  Commonly known as: Plavix           cyanocobalamin 250 mcg tablet  Commonly  "known as: Vitamin B-12           FLUoxetine 10 mg capsule  Commonly known as: PROzac           isosorbide mononitrate ER 30 mg 24 hr tablet  Commonly known as: Imdur           lisinopril 2.5 mg tablet      Take 1 tablet (2.5 mg) by mouth once daily.       melatonin 3 mg tablet           nitroglycerin 0.4 mg SL tablet  Commonly known as: Nitrostat           pantoprazole 20 mg EC tablet  Commonly known as: ProtoNix           rivastigmine 1.5 mg capsule  Commonly known as: Exelon           Spiriva Respimat 2.5 mcg/actuation inhaler  Generic drug: tiotropium                  STOP taking these medications      lisinopriL-hydrochlorothiazide 20-12.5 mg tablet                  Where to Get Your Medications        Information about where to get these medications is not yet available    Ask your nurse or doctor about these medications  lisinopril 2.5 mg tablet         Outpatient Follow-Up  No future appointments.        Test Results Pending at Discharge  Pending Labs       No current pending labs.              Physical Exam at Discharge:  /57 (BP Location: Left arm, Patient Position: Lying)   Pulse 77   Temp 36.6 °C (97.9 °F) (Temporal)   Resp 16   Ht 1.803 m (5' 11\")   Wt 90.7 kg (200 lb)   SpO2 97%   BMI 27.89 kg/m²   GENERAL: No acute distress.  EYES: Extraocular movements intact.  No scleral icterus.  EARS:  External ears normal.  HEAD: Normocephalic and atraumatic.  NECK: Supple neck.  HEART: Regular rate.      LUNGS: Chest clear to auscultation bilaterally.    ABDOMEN: Soft, non-tender, non-distended.      EXTREMITIES: No cyanosis or edema.  SKIN: Warm and dry.  No rashes.    NEUROLOGIC: Alert and oriented x2.    PSYCHIATRIC: Appropriate mood and affect.    Melly Morrow MD  35 minutes spent on discharge    03/14/24  8:58 AM    "

## 2024-04-06 LAB
ATRIAL RATE: 66 BPM
ATRIAL RATE: 69 BPM
P AXIS: 73 DEGREES
P AXIS: 77 DEGREES
P OFFSET: 178 MS
P OFFSET: 184 MS
P ONSET: 128 MS
P ONSET: 132 MS
PR INTERVAL: 158 MS
PR INTERVAL: 158 MS
Q ONSET: 207 MS
Q ONSET: 211 MS
QRS COUNT: 11 BEATS
QRS COUNT: 11 BEATS
QRS DURATION: 138 MS
QRS DURATION: 140 MS
QT INTERVAL: 410 MS
QT INTERVAL: 432 MS
QTC CALCULATION(BAZETT): 439 MS
QTC CALCULATION(BAZETT): 452 MS
QTC FREDERICIA: 429 MS
QTC FREDERICIA: 446 MS
R AXIS: -50 DEGREES
R AXIS: 265 DEGREES
T AXIS: 56 DEGREES
T AXIS: 63 DEGREES
T OFFSET: 416 MS
T OFFSET: 423 MS
VENTRICULAR RATE: 66 BPM
VENTRICULAR RATE: 69 BPM

## 2024-04-07 ENCOUNTER — HOSPITAL ENCOUNTER (EMERGENCY)
Facility: HOSPITAL | Age: 74
Discharge: HOME | End: 2024-04-07
Attending: EMERGENCY MEDICINE
Payer: MEDICARE

## 2024-04-07 ENCOUNTER — APPOINTMENT (OUTPATIENT)
Dept: RADIOLOGY | Facility: HOSPITAL | Age: 74
End: 2024-04-07
Payer: MEDICARE

## 2024-04-07 VITALS
OXYGEN SATURATION: 96 % | SYSTOLIC BLOOD PRESSURE: 136 MMHG | HEIGHT: 72 IN | WEIGHT: 170 LBS | DIASTOLIC BLOOD PRESSURE: 68 MMHG | TEMPERATURE: 97.3 F | BODY MASS INDEX: 23.03 KG/M2 | RESPIRATION RATE: 15 BRPM | HEART RATE: 68 BPM
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
## 2024-04-07 DIAGNOSIS — S09.90XA CLOSED HEAD INJURY, INITIAL ENCOUNTER: ICD-10-CM

## 2024-04-07 PROCEDURE — G0390 TRAUMA RESPONS W/HOSP CRITI: HCPCS

## 2024-04-07 PROCEDURE — 72125 CT NECK SPINE W/O DYE: CPT | Performed by: RADIOLOGY

## 2024-04-07 PROCEDURE — 70450 CT HEAD/BRAIN W/O DYE: CPT | Performed by: RADIOLOGY

## 2024-04-07 PROCEDURE — 99285 EMERGENCY DEPT VISIT HI MDM: CPT

## 2024-04-07 PROCEDURE — 72125 CT NECK SPINE W/O DYE: CPT

## 2024-04-07 PROCEDURE — 70450 CT HEAD/BRAIN W/O DYE: CPT

## 2024-04-07 PROCEDURE — 99284 EMERGENCY DEPT VISIT MOD MDM: CPT | Performed by: EMERGENCY MEDICINE

## 2024-04-07 ASSESSMENT — PAIN SCALES - WONG BAKER: WONGBAKER_NUMERICALRESPONSE: NO HURT

## 2024-04-07 ASSESSMENT — PAIN SCALES - GENERAL
PAINLEVEL_OUTOF10: 0 - NO PAIN

## 2024-04-07 ASSESSMENT — COLUMBIA-SUICIDE SEVERITY RATING SCALE - C-SSRS
2. HAVE YOU ACTUALLY HAD ANY THOUGHTS OF KILLING YOURSELF?: NO
6. HAVE YOU EVER DONE ANYTHING, STARTED TO DO ANYTHING, OR PREPARED TO DO ANYTHING TO END YOUR LIFE?: NO
1. IN THE PAST MONTH, HAVE YOU WISHED YOU WERE DEAD OR WISHED YOU COULD GO TO SLEEP AND NOT WAKE UP?: NO

## 2024-04-07 ASSESSMENT — PAIN - FUNCTIONAL ASSESSMENT
PAIN_FUNCTIONAL_ASSESSMENT: 0-10
PAIN_FUNCTIONAL_ASSESSMENT: WONG-BAKER FACES
PAIN_FUNCTIONAL_ASSESSMENT: WONG-BAKER FACES

## 2024-04-07 NOTE — ED PROVIDER NOTES
HPI   Chief Complaint   Patient presents with    Fall     In dining room at nursing home. Attempted to sit in chair but missed; fell and hit head. Pt on plavix       Patient is a 73-year-old male with dementia, coronary artery disease, hypertension, hyperlipidemia presents emerged part for head injury.  Patient was at the dining sorto at his nursing facility.  He missed his chair while trying to sit down.  He struck his head on the floor.  He is on aspirin and Plavix.  He did not lose any consciousness, have any nausea or vomiting, and he is at his neurological baseline.  He is minimally verbal, pleasantly confused.  Limited trauma is activated as he is on antiplatelets and head injury.  On arrival to the emergency department, patient is denying any pain anywhere, including his head and neck.      History provided by:  Medical records and EMS personnel  History limited by:  Dementia                      Kenyatta Coma Scale Score: 14                     Patient History   Past Medical History:   Diagnosis Date    COPD (chronic obstructive pulmonary disease) (CMS/[facility])     Coronary artery disease     Dementia (CMS/[facility])     Depression     Dysphagia     Hyperlipidemia     Hypertension     Insomnia     Muscle weakness     Peripheral vascular disease (CMS/[facility])     Prediabetes     Protein calorie malnutrition (CMS/[facility])      History reviewed. No pertinent surgical history.  Family History   Family history unknown: Yes     Social History     Tobacco Use    Smoking status: Former     Types: Cigarettes    Smokeless tobacco: Not on file   Substance Use Topics    Alcohol use: Not Currently    Drug use: Never       Physical Exam   ED Triage Vitals [04/07/24 1255]   Temperature Heart Rate Respirations BP   36.3 °C (97.3 °F) 72 16 138/67      Pulse Ox Temp src Heart Rate Source Patient Position   95 % -- -- --      BP Location FiO2 (%)     -- --       Physical Exam  Vitals and nursing note reviewed.   Constitutional:       General: He  is not in acute distress.     Appearance: He is well-developed.   HENT:      Head: Normocephalic and atraumatic.      Right Ear: External ear normal.      Left Ear: External ear normal.      Nose: Nose normal. No congestion or rhinorrhea.      Mouth/Throat:      Mouth: Mucous membranes are moist.   Eyes:      General: No scleral icterus.     Conjunctiva/sclera: Conjunctivae normal.      Pupils: Pupils are equal, round, and reactive to light.   Cardiovascular:      Rate and Rhythm: Normal rate and regular rhythm.      Heart sounds: No murmur heard.  Pulmonary:      Effort: Pulmonary effort is normal. No respiratory distress.      Breath sounds: Normal breath sounds.   Abdominal:      Palpations: Abdomen is soft.      Tenderness: There is no abdominal tenderness.   Musculoskeletal:         General: No swelling, tenderness, deformity or signs of injury. Normal range of motion.      Cervical back: Neck supple. No rigidity or tenderness.   Skin:     General: Skin is warm and dry.   Neurological:      Mental Status: He is alert. Mental status is at baseline.      Comments: Oriented to self.   Psychiatric:         Mood and Affect: Mood normal.         ED Course & MDM   Diagnoses as of 04/07/24 1419   Fall, initial encounter   Closed head injury, initial encounter       Medical Decision Making  Patient is a 73-year-old male on Plavix and aspirin presents emergency department for head injury after a witnessed mechanical fall at his nursing facility.  Limited trauma is activated due to the head injury and anticoagulation use.  Patient arrives via EMS.  A c-collar is placed on the patient.  He did not have any neck tenderness step-offs, or deformities.  Airway is patent, bilateral breath sounds equal, radial and distal pulses intact, able to move all extremities on command.  No signs of trauma secondary survey.  Patient is at baseline per EMS.  Given that this was a witnessed mechanical fall, lab work not indicated at this  time.  CT imaging of the head and C-spine are obtained.  No other injuries are noted, no other imaging is indicated at this time.    CT head W O contrast trauma protocol   Final Result    No acute intracranial abnormality was identified.          No detected fracture.          2-3 mm C7 anterolisthesis.          Mild-to-moderate multilevel cervical spondylosis and degenerative    disc changes.          Mild arterial vascular calcifications.          Similar pre-existing intracranial findings as reported.          MACRO:    None                Signed by: Ameya Gaytan 4/7/2024 1:39 PM    Dictation workstation:   [id number]     CT cervical spine wo IV contrast   Final Result    No acute intracranial abnormality was identified.          No detected fracture.          2-3 mm C7 anterolisthesis.          Mild-to-moderate multilevel cervical spondylosis and degenerative    disc changes.          Mild arterial vascular calcifications.          Similar pre-existing intracranial findings as reported.          MACRO:    None                Signed by: Ameya Gaytan 4/7/2024 1:39 PM    Dictation workstation:   [id number]      CT imaging is negative.  Patient C-spine is cleared.  He will be discharged back to his nursing facility.  Home care and return instructions including his discharge paperwork.  Patient discharged in stable condition.    Bret Hammonds DO, PGY-3  Emergency Medicine Resident    Amount and/or Complexity of Data Reviewed  Radiology: ordered.        Procedure  Procedures     Bret Hammonds DO  Resident  04/07/24 5022

## 2024-04-07 NOTE — DISCHARGE INSTRUCTIONS
Return to the emergency department if you have any slurred speech, facial droop, numbness or weakness in one-sided body, confusion, or fainting episodes.

## 2024-04-07 NOTE — ED NOTES
PT demented not able to answer questions appropriately; no apparent signs of self harm noted     Fredi Vargas, RN  04/07/24 2156

## 2025-02-05 ENCOUNTER — APPOINTMENT (OUTPATIENT)
Dept: CARDIOLOGY | Facility: HOSPITAL | Age: 75
End: 2025-02-05
Payer: COMMERCIAL

## 2025-02-05 ENCOUNTER — APPOINTMENT (OUTPATIENT)
Dept: RADIOLOGY | Facility: HOSPITAL | Age: 75
End: 2025-02-05
Payer: COMMERCIAL

## 2025-02-05 ENCOUNTER — HOSPITAL ENCOUNTER (INPATIENT)
Facility: HOSPITAL | Age: 75
LOS: 1 days | Discharge: NURSING, REHAB OR LONG-TERM CARE | End: 2025-02-06
Attending: EMERGENCY MEDICINE | Admitting: INTERNAL MEDICINE
Payer: COMMERCIAL

## 2025-02-05 DIAGNOSIS — R04.0 EPISTAXIS DUE TO TRAUMA: ICD-10-CM

## 2025-02-05 DIAGNOSIS — S06.5XAA SUBDURAL HEMATOMA (MULTI): ICD-10-CM

## 2025-02-05 DIAGNOSIS — S01.81XA FACIAL LACERATION, INITIAL ENCOUNTER: ICD-10-CM

## 2025-02-05 DIAGNOSIS — I62.9 INTRACRANIAL HEMORRHAGE (MULTI): ICD-10-CM

## 2025-02-05 DIAGNOSIS — S09.90XA HEAD INJURY, INITIAL ENCOUNTER: Primary | ICD-10-CM

## 2025-02-05 DIAGNOSIS — S02.2XXA CLOSED FRACTURE OF NASAL BONE, INITIAL ENCOUNTER: ICD-10-CM

## 2025-02-05 DIAGNOSIS — S00.83XA TRAUMATIC HEMATOMA OF FOREHEAD, INITIAL ENCOUNTER: ICD-10-CM

## 2025-02-05 LAB
ABO GROUP (TYPE) IN BLOOD: NORMAL
ALBUMIN SERPL BCP-MCNC: 3.9 G/DL (ref 3.4–5)
ALP SERPL-CCNC: 83 U/L (ref 33–136)
ALT SERPL W P-5'-P-CCNC: 15 U/L (ref 10–52)
ANION GAP SERPL CALC-SCNC: 12 MMOL/L (ref 10–20)
ANTIBODY SCREEN: NORMAL
AST SERPL W P-5'-P-CCNC: 30 U/L (ref 9–39)
BASOPHILS # BLD AUTO: 0.03 X10*3/UL (ref 0–0.1)
BASOPHILS NFR BLD AUTO: 0.2 %
BILIRUB SERPL-MCNC: 0.4 MG/DL (ref 0–1.2)
BUN SERPL-MCNC: 16 MG/DL (ref 6–23)
CALCIUM SERPL-MCNC: 9.4 MG/DL (ref 8.6–10.3)
CARDIAC TROPONIN I PNL SERPL HS: 10 NG/L (ref 0–20)
CHLORIDE SERPL-SCNC: 108 MMOL/L (ref 98–107)
CO2 SERPL-SCNC: 25 MMOL/L (ref 21–32)
CREAT SERPL-MCNC: 1.09 MG/DL (ref 0.5–1.3)
EGFRCR SERPLBLD CKD-EPI 2021: 70 ML/MIN/1.73M*2
EOSINOPHIL # BLD AUTO: 0.2 X10*3/UL (ref 0–0.4)
EOSINOPHIL NFR BLD AUTO: 1.6 %
ERYTHROCYTE [DISTWIDTH] IN BLOOD BY AUTOMATED COUNT: 15.2 % (ref 11.5–14.5)
GLUCOSE SERPL-MCNC: 118 MG/DL (ref 74–99)
HCT VFR BLD AUTO: 38.2 % (ref 41–52)
HGB BLD-MCNC: 12.2 G/DL (ref 13.5–17.5)
IMM GRANULOCYTES # BLD AUTO: 0.04 X10*3/UL (ref 0–0.5)
IMM GRANULOCYTES NFR BLD AUTO: 0.3 % (ref 0–0.9)
INR PPP: 1.2 (ref 0.9–1.1)
LYMPHOCYTES # BLD AUTO: 2.83 X10*3/UL (ref 0.8–3)
LYMPHOCYTES NFR BLD AUTO: 22.8 %
MAGNESIUM SERPL-MCNC: 1.87 MG/DL (ref 1.6–2.4)
MCH RBC QN AUTO: 28.6 PG (ref 26–34)
MCHC RBC AUTO-ENTMCNC: 31.9 G/DL (ref 32–36)
MCV RBC AUTO: 90 FL (ref 80–100)
MONOCYTES # BLD AUTO: 1.03 X10*3/UL (ref 0.05–0.8)
MONOCYTES NFR BLD AUTO: 8.3 %
NEUTROPHILS # BLD AUTO: 8.3 X10*3/UL (ref 1.6–5.5)
NEUTROPHILS NFR BLD AUTO: 66.8 %
NRBC BLD-RTO: 0 /100 WBCS (ref 0–0)
PLATELET # BLD AUTO: 310 X10*3/UL (ref 150–450)
POTASSIUM SERPL-SCNC: 6.1 MMOL/L (ref 3.5–5.3)
PROT SERPL-MCNC: 8 G/DL (ref 6.4–8.2)
PROTHROMBIN TIME: 13.5 SECONDS (ref 9.8–12.8)
RBC # BLD AUTO: 4.26 X10*6/UL (ref 4.5–5.9)
RH FACTOR (ANTIGEN D): NORMAL
SODIUM SERPL-SCNC: 139 MMOL/L (ref 136–145)
WBC # BLD AUTO: 12.4 X10*3/UL (ref 4.4–11.3)

## 2025-02-05 PROCEDURE — 72125 CT NECK SPINE W/O DYE: CPT

## 2025-02-05 PROCEDURE — 2500000004 HC RX 250 GENERAL PHARMACY W/ HCPCS (ALT 636 FOR OP/ED)

## 2025-02-05 PROCEDURE — 80053 COMPREHEN METABOLIC PANEL: CPT | Performed by: EMERGENCY MEDICINE

## 2025-02-05 PROCEDURE — 71045 X-RAY EXAM CHEST 1 VIEW: CPT

## 2025-02-05 PROCEDURE — 30901 CONTROL OF NOSEBLEED: CPT | Mod: RT

## 2025-02-05 PROCEDURE — 36415 COLL VENOUS BLD VENIPUNCTURE: CPT | Performed by: EMERGENCY MEDICINE

## 2025-02-05 PROCEDURE — 70486 CT MAXILLOFACIAL W/O DYE: CPT

## 2025-02-05 PROCEDURE — 86901 BLOOD TYPING SEROLOGIC RH(D): CPT | Performed by: EMERGENCY MEDICINE

## 2025-02-05 PROCEDURE — 12013 RPR F/E/E/N/L/M 2.6-5.0 CM: CPT

## 2025-02-05 PROCEDURE — 71045 X-RAY EXAM CHEST 1 VIEW: CPT | Performed by: RADIOLOGY

## 2025-02-05 PROCEDURE — 84484 ASSAY OF TROPONIN QUANT: CPT | Performed by: EMERGENCY MEDICINE

## 2025-02-05 PROCEDURE — 99291 CRITICAL CARE FIRST HOUR: CPT | Performed by: EMERGENCY MEDICINE

## 2025-02-05 PROCEDURE — 70450 CT HEAD/BRAIN W/O DYE: CPT

## 2025-02-05 PROCEDURE — 93005 ELECTROCARDIOGRAM TRACING: CPT

## 2025-02-05 PROCEDURE — 85610 PROTHROMBIN TIME: CPT | Performed by: EMERGENCY MEDICINE

## 2025-02-05 PROCEDURE — 86850 RBC ANTIBODY SCREEN: CPT | Performed by: EMERGENCY MEDICINE

## 2025-02-05 PROCEDURE — 85025 COMPLETE CBC W/AUTO DIFF WBC: CPT | Performed by: EMERGENCY MEDICINE

## 2025-02-05 PROCEDURE — 76377 3D RENDER W/INTRP POSTPROCES: CPT

## 2025-02-05 PROCEDURE — G0390 TRAUMA RESPONS W/HOSP CRITI: HCPCS

## 2025-02-05 PROCEDURE — 84145 PROCALCITONIN (PCT): CPT | Mod: STJLAB

## 2025-02-05 PROCEDURE — 85652 RBC SED RATE AUTOMATED: CPT

## 2025-02-05 PROCEDURE — 83735 ASSAY OF MAGNESIUM: CPT | Performed by: EMERGENCY MEDICINE

## 2025-02-05 PROCEDURE — 99285 EMERGENCY DEPT VISIT HI MDM: CPT | Performed by: EMERGENCY MEDICINE

## 2025-02-05 RX ORDER — LIDOCAINE HYDROCHLORIDE AND EPINEPHRINE 10; 10 UG/ML; MG/ML
INJECTION, SOLUTION INFILTRATION; PERINEURAL
Status: COMPLETED
Start: 2025-02-05 | End: 2025-02-05

## 2025-02-05 RX ORDER — LIDOCAINE HYDROCHLORIDE AND EPINEPHRINE 10; 10 UG/ML; MG/ML
10 INJECTION, SOLUTION INFILTRATION; PERINEURAL ONCE
Status: COMPLETED | OUTPATIENT
Start: 2025-02-05 | End: 2025-02-05

## 2025-02-05 RX ADMIN — LIDOCAINE HYDROCHLORIDE,EPINEPHRINE BITARTRATE 10 ML: 10; .01 INJECTION, SOLUTION INFILTRATION; PERINEURAL at 23:48

## 2025-02-05 RX ADMIN — LIDOCAINE HYDROCHLORIDE AND EPINEPHRINE 10 ML: 10; 10 INJECTION, SOLUTION INFILTRATION; PERINEURAL at 23:48

## 2025-02-05 ASSESSMENT — COLUMBIA-SUICIDE SEVERITY RATING SCALE - C-SSRS
6. HAVE YOU EVER DONE ANYTHING, STARTED TO DO ANYTHING, OR PREPARED TO DO ANYTHING TO END YOUR LIFE?: NO
1. IN THE PAST MONTH, HAVE YOU WISHED YOU WERE DEAD OR WISHED YOU COULD GO TO SLEEP AND NOT WAKE UP?: NO
2. HAVE YOU ACTUALLY HAD ANY THOUGHTS OF KILLING YOURSELF?: NO

## 2025-02-05 ASSESSMENT — PAIN - FUNCTIONAL ASSESSMENT: PAIN_FUNCTIONAL_ASSESSMENT: CPOT (CRITICAL CARE PAIN OBSERVATION TOOL)

## 2025-02-06 ENCOUNTER — APPOINTMENT (OUTPATIENT)
Dept: CARDIOLOGY | Facility: HOSPITAL | Age: 75
End: 2025-02-06
Payer: COMMERCIAL

## 2025-02-06 ENCOUNTER — APPOINTMENT (OUTPATIENT)
Dept: RADIOLOGY | Facility: HOSPITAL | Age: 75
End: 2025-02-06
Payer: COMMERCIAL

## 2025-02-06 VITALS
BODY MASS INDEX: 21.33 KG/M2 | TEMPERATURE: 97.7 F | RESPIRATION RATE: 20 BRPM | HEIGHT: 71 IN | HEART RATE: 82 BPM | WEIGHT: 152.34 LBS | DIASTOLIC BLOOD PRESSURE: 56 MMHG | OXYGEN SATURATION: 97 % | SYSTOLIC BLOOD PRESSURE: 112 MMHG

## 2025-02-06 PROBLEM — S09.90XA HEAD INJURY, INITIAL ENCOUNTER: Status: ACTIVE | Noted: 2025-02-06

## 2025-02-06 LAB
ALBUMIN SERPL BCP-MCNC: 3.6 G/DL (ref 3.4–5)
ALBUMIN SERPL BCP-MCNC: 3.7 G/DL (ref 3.4–5)
ALP SERPL-CCNC: 76 U/L (ref 33–136)
ALP SERPL-CCNC: 80 U/L (ref 33–136)
ALT SERPL W P-5'-P-CCNC: 14 U/L (ref 10–52)
ALT SERPL W P-5'-P-CCNC: 15 U/L (ref 10–52)
ANION GAP SERPL CALC-SCNC: 11 MMOL/L (ref 10–20)
ANION GAP SERPL CALC-SCNC: 13 MMOL/L (ref 10–20)
APPEARANCE UR: CLEAR
AST SERPL W P-5'-P-CCNC: 20 U/L (ref 9–39)
AST SERPL W P-5'-P-CCNC: 26 U/L (ref 9–39)
ATRIAL RATE: 107 BPM
BASOPHILS # BLD AUTO: 0.05 X10*3/UL (ref 0–0.1)
BASOPHILS NFR BLD AUTO: 0.4 %
BILIRUB SERPL-MCNC: 0.4 MG/DL (ref 0–1.2)
BILIRUB SERPL-MCNC: 0.4 MG/DL (ref 0–1.2)
BILIRUB UR STRIP.AUTO-MCNC: NEGATIVE MG/DL
BUN SERPL-MCNC: 16 MG/DL (ref 6–23)
BUN SERPL-MCNC: 16 MG/DL (ref 6–23)
CALCIUM SERPL-MCNC: 9.5 MG/DL (ref 8.6–10.3)
CALCIUM SERPL-MCNC: 9.5 MG/DL (ref 8.6–10.3)
CARDIAC TROPONIN I PNL SERPL HS: 11 NG/L (ref 0–20)
CHLORIDE SERPL-SCNC: 109 MMOL/L (ref 98–107)
CHLORIDE SERPL-SCNC: 110 MMOL/L (ref 98–107)
CO2 SERPL-SCNC: 23 MMOL/L (ref 21–32)
CO2 SERPL-SCNC: 26 MMOL/L (ref 21–32)
COLOR UR: NORMAL
CREAT SERPL-MCNC: 0.98 MG/DL (ref 0.5–1.3)
CREAT SERPL-MCNC: 1.09 MG/DL (ref 0.5–1.3)
CRP SERPL-MCNC: 1.96 MG/DL
EGFRCR SERPLBLD CKD-EPI 2021: 70 ML/MIN/1.73M*2
EGFRCR SERPLBLD CKD-EPI 2021: 80 ML/MIN/1.73M*2
EOSINOPHIL # BLD AUTO: 0.08 X10*3/UL (ref 0–0.4)
EOSINOPHIL NFR BLD AUTO: 0.6 %
ERYTHROCYTE [DISTWIDTH] IN BLOOD BY AUTOMATED COUNT: 15.2 % (ref 11.5–14.5)
ERYTHROCYTE [SEDIMENTATION RATE] IN BLOOD BY WESTERGREN METHOD: 63 MM/H (ref 0–20)
GLUCOSE BLD MANUAL STRIP-MCNC: 101 MG/DL (ref 74–99)
GLUCOSE BLD MANUAL STRIP-MCNC: 101 MG/DL (ref 74–99)
GLUCOSE BLD MANUAL STRIP-MCNC: 112 MG/DL (ref 74–99)
GLUCOSE BLD MANUAL STRIP-MCNC: 123 MG/DL (ref 74–99)
GLUCOSE SERPL-MCNC: 130 MG/DL (ref 74–99)
GLUCOSE SERPL-MCNC: 130 MG/DL (ref 74–99)
GLUCOSE UR STRIP.AUTO-MCNC: NORMAL MG/DL
HCT VFR BLD AUTO: 34.4 % (ref 41–52)
HGB BLD-MCNC: 10.9 G/DL (ref 13.5–17.5)
HOLD SPECIMEN: NORMAL
IMM GRANULOCYTES # BLD AUTO: 0.05 X10*3/UL (ref 0–0.5)
IMM GRANULOCYTES NFR BLD AUTO: 0.4 % (ref 0–0.9)
KETONES UR STRIP.AUTO-MCNC: NEGATIVE MG/DL
LACTATE SERPL-SCNC: 1.6 MMOL/L (ref 0.4–2)
LEUKOCYTE ESTERASE UR QL STRIP.AUTO: NEGATIVE
LYMPHOCYTES # BLD AUTO: 1.75 X10*3/UL (ref 0.8–3)
LYMPHOCYTES NFR BLD AUTO: 13 %
MAGNESIUM SERPL-MCNC: 1.65 MG/DL (ref 1.6–2.4)
MCH RBC QN AUTO: 27.9 PG (ref 26–34)
MCHC RBC AUTO-ENTMCNC: 31.7 G/DL (ref 32–36)
MCV RBC AUTO: 88 FL (ref 80–100)
MONOCYTES # BLD AUTO: 0.94 X10*3/UL (ref 0.05–0.8)
MONOCYTES NFR BLD AUTO: 7 %
NEUTROPHILS # BLD AUTO: 10.55 X10*3/UL (ref 1.6–5.5)
NEUTROPHILS NFR BLD AUTO: 78.6 %
NITRITE UR QL STRIP.AUTO: NEGATIVE
NRBC BLD-RTO: 0 /100 WBCS (ref 0–0)
P AXIS: 69 DEGREES
P OFFSET: 171 MS
P ONSET: 129 MS
PH UR STRIP.AUTO: 5.5 [PH]
PLATELET # BLD AUTO: 308 X10*3/UL (ref 150–450)
POTASSIUM SERPL-SCNC: 3.6 MMOL/L (ref 3.5–5.3)
POTASSIUM SERPL-SCNC: 3.7 MMOL/L (ref 3.5–5.3)
POTASSIUM SERPL-SCNC: 3.7 MMOL/L (ref 3.5–5.3)
PR INTERVAL: 130 MS
PROCALCITONIN SERPL-MCNC: 0.04 NG/ML
PROT SERPL-MCNC: 7.3 G/DL (ref 6.4–8.2)
PROT SERPL-MCNC: 7.3 G/DL (ref 6.4–8.2)
PROT UR STRIP.AUTO-MCNC: NEGATIVE MG/DL
Q ONSET: 194 MS
QRS COUNT: 17 BEATS
QRS DURATION: 126 MS
QT INTERVAL: 376 MS
QTC CALCULATION(BAZETT): 501 MS
QTC FREDERICIA: 456 MS
R AXIS: -86 DEGREES
RBC # BLD AUTO: 3.9 X10*6/UL (ref 4.5–5.9)
RBC # UR STRIP.AUTO: NEGATIVE MG/DL
SODIUM SERPL-SCNC: 142 MMOL/L (ref 136–145)
SODIUM SERPL-SCNC: 142 MMOL/L (ref 136–145)
SP GR UR STRIP.AUTO: 1.03
T AXIS: 72 DEGREES
T OFFSET: 382 MS
UROBILINOGEN UR STRIP.AUTO-MCNC: NORMAL MG/DL
VENTRICULAR RATE: 107 BPM
WBC # BLD AUTO: 13.4 X10*3/UL (ref 4.4–11.3)

## 2025-02-06 PROCEDURE — 99222 1ST HOSP IP/OBS MODERATE 55: CPT | Performed by: PSYCHIATRY & NEUROLOGY

## 2025-02-06 PROCEDURE — 70450 CT HEAD/BRAIN W/O DYE: CPT | Performed by: RADIOLOGY

## 2025-02-06 PROCEDURE — 2500000005 HC RX 250 GENERAL PHARMACY W/O HCPCS

## 2025-02-06 PROCEDURE — 99223 1ST HOSP IP/OBS HIGH 75: CPT | Performed by: NURSE PRACTITIONER

## 2025-02-06 PROCEDURE — 2500000004 HC RX 250 GENERAL PHARMACY W/ HCPCS (ALT 636 FOR OP/ED)

## 2025-02-06 PROCEDURE — 93010 ELECTROCARDIOGRAM REPORT: CPT | Performed by: INTERNAL MEDICINE

## 2025-02-06 PROCEDURE — 2500000001 HC RX 250 WO HCPCS SELF ADMINISTERED DRUGS (ALT 637 FOR MEDICARE OP)

## 2025-02-06 PROCEDURE — 36415 COLL VENOUS BLD VENIPUNCTURE: CPT

## 2025-02-06 PROCEDURE — 99239 HOSP IP/OBS DSCHRG MGMT >30: CPT | Performed by: INTERNAL MEDICINE

## 2025-02-06 PROCEDURE — G0378 HOSPITAL OBSERVATION PER HR: HCPCS

## 2025-02-06 PROCEDURE — 94640 AIRWAY INHALATION TREATMENT: CPT

## 2025-02-06 PROCEDURE — 36415 COLL VENOUS BLD VENIPUNCTURE: CPT | Performed by: EMERGENCY MEDICINE

## 2025-02-06 PROCEDURE — 93005 ELECTROCARDIOGRAM TRACING: CPT

## 2025-02-06 PROCEDURE — 84132 ASSAY OF SERUM POTASSIUM: CPT

## 2025-02-06 PROCEDURE — 70450 CT HEAD/BRAIN W/O DYE: CPT

## 2025-02-06 PROCEDURE — 84132 ASSAY OF SERUM POTASSIUM: CPT | Performed by: EMERGENCY MEDICINE

## 2025-02-06 PROCEDURE — 83735 ASSAY OF MAGNESIUM: CPT

## 2025-02-06 PROCEDURE — 99291 CRITICAL CARE FIRST HOUR: CPT | Performed by: INTERNAL MEDICINE

## 2025-02-06 PROCEDURE — 2500000001 HC RX 250 WO HCPCS SELF ADMINISTERED DRUGS (ALT 637 FOR MEDICARE OP): Performed by: NURSE PRACTITIONER

## 2025-02-06 PROCEDURE — 83605 ASSAY OF LACTIC ACID: CPT

## 2025-02-06 PROCEDURE — 51701 INSERT BLADDER CATHETER: CPT

## 2025-02-06 PROCEDURE — 2020000001 HC ICU ROOM DAILY

## 2025-02-06 PROCEDURE — 30903 CONTROL OF NOSEBLEED: CPT | Performed by: OTOLARYNGOLOGY

## 2025-02-06 PROCEDURE — 2500000002 HC RX 250 W HCPCS SELF ADMINISTERED DRUGS (ALT 637 FOR MEDICARE OP, ALT 636 FOR OP/ED)

## 2025-02-06 PROCEDURE — 81003 URINALYSIS AUTO W/O SCOPE: CPT | Performed by: EMERGENCY MEDICINE

## 2025-02-06 PROCEDURE — 85025 COMPLETE CBC W/AUTO DIFF WBC: CPT

## 2025-02-06 PROCEDURE — 86140 C-REACTIVE PROTEIN: CPT

## 2025-02-06 PROCEDURE — 82947 ASSAY GLUCOSE BLOOD QUANT: CPT

## 2025-02-06 PROCEDURE — 80053 COMPREHEN METABOLIC PANEL: CPT

## 2025-02-06 PROCEDURE — 99222 1ST HOSP IP/OBS MODERATE 55: CPT | Performed by: OTOLARYNGOLOGY

## 2025-02-06 PROCEDURE — 2500000005 HC RX 250 GENERAL PHARMACY W/O HCPCS: Performed by: EMERGENCY MEDICINE

## 2025-02-06 PROCEDURE — 84484 ASSAY OF TROPONIN QUANT: CPT | Performed by: EMERGENCY MEDICINE

## 2025-02-06 PROCEDURE — 2500000004 HC RX 250 GENERAL PHARMACY W/ HCPCS (ALT 636 FOR OP/ED): Performed by: INTERNAL MEDICINE

## 2025-02-06 RX ORDER — GUAIFENESIN 600 MG/1
600 TABLET, EXTENDED RELEASE ORAL 2 TIMES DAILY PRN
Status: DISCONTINUED | OUTPATIENT
Start: 2025-02-06 | End: 2025-02-07 | Stop reason: HOSPADM

## 2025-02-06 RX ORDER — ADHESIVE BANDAGE
30 BANDAGE TOPICAL DAILY PRN
COMMUNITY

## 2025-02-06 RX ORDER — RIVASTIGMINE TARTRATE 1.5 MG/1
3 CAPSULE ORAL 2 TIMES DAILY
Status: DISCONTINUED | OUTPATIENT
Start: 2025-02-06 | End: 2025-02-07 | Stop reason: HOSPADM

## 2025-02-06 RX ORDER — TRANEXAMIC ACID 100 MG/ML
1000 INJECTION, SOLUTION INTRAVENOUS ONCE
Status: COMPLETED | OUTPATIENT
Start: 2025-02-06 | End: 2025-02-06

## 2025-02-06 RX ORDER — ISOSORBIDE MONONITRATE 30 MG/1
30 TABLET, EXTENDED RELEASE ORAL DAILY
Status: DISCONTINUED | OUTPATIENT
Start: 2025-02-06 | End: 2025-02-07 | Stop reason: HOSPADM

## 2025-02-06 RX ORDER — MAGNESIUM SULFATE HEPTAHYDRATE 40 MG/ML
2 INJECTION, SOLUTION INTRAVENOUS ONCE
Status: COMPLETED | OUTPATIENT
Start: 2025-02-06 | End: 2025-02-06

## 2025-02-06 RX ORDER — MIRTAZAPINE 7.5 MG/1
7.5 TABLET, FILM COATED ORAL NIGHTLY
COMMUNITY

## 2025-02-06 RX ORDER — CILOSTAZOL 100 MG/1
100 TABLET ORAL 2 TIMES DAILY
Status: DISCONTINUED | OUTPATIENT
Start: 2025-02-06 | End: 2025-02-07 | Stop reason: HOSPADM

## 2025-02-06 RX ORDER — ATORVASTATIN CALCIUM 40 MG/1
40 TABLET, FILM COATED ORAL NIGHTLY
Status: DISCONTINUED | OUTPATIENT
Start: 2025-02-06 | End: 2025-02-07 | Stop reason: HOSPADM

## 2025-02-06 RX ORDER — LEVETIRACETAM 500 MG/1
500 TABLET ORAL 2 TIMES DAILY
Status: DISCONTINUED | OUTPATIENT
Start: 2025-02-06 | End: 2025-02-07 | Stop reason: HOSPADM

## 2025-02-06 RX ORDER — INSULIN LISPRO 100 [IU]/ML
0-5 INJECTION, SOLUTION INTRAVENOUS; SUBCUTANEOUS EVERY 4 HOURS
Status: DISCONTINUED | OUTPATIENT
Start: 2025-02-06 | End: 2025-02-06

## 2025-02-06 RX ORDER — DEXTROSE 50 % IN WATER (D50W) INTRAVENOUS SYRINGE
25
Status: DISCONTINUED | OUTPATIENT
Start: 2025-02-06 | End: 2025-02-07 | Stop reason: HOSPADM

## 2025-02-06 RX ORDER — SILVER NITRATE 38.21; 12.74 MG/1; MG/1
STICK TOPICAL ONCE
Status: COMPLETED | OUTPATIENT
Start: 2025-02-06 | End: 2025-02-06

## 2025-02-06 RX ORDER — IPRATROPIUM BROMIDE 0.5 MG/2.5ML
0.5 SOLUTION RESPIRATORY (INHALATION)
Status: DISCONTINUED | OUTPATIENT
Start: 2025-02-06 | End: 2025-02-07 | Stop reason: HOSPADM

## 2025-02-06 RX ORDER — CARVEDILOL 6.25 MG/1
6.25 TABLET ORAL 2 TIMES DAILY
Status: DISCONTINUED | OUTPATIENT
Start: 2025-02-06 | End: 2025-02-07 | Stop reason: HOSPADM

## 2025-02-06 RX ORDER — TALC
3 POWDER (GRAM) TOPICAL NIGHTLY
Status: DISCONTINUED | OUTPATIENT
Start: 2025-02-06 | End: 2025-02-06

## 2025-02-06 RX ORDER — OXYMETAZOLINE HCL 0.05 %
2 SPRAY, NON-AEROSOL (ML) NASAL EVERY 12 HOURS PRN
Status: DISCONTINUED | OUTPATIENT
Start: 2025-02-06 | End: 2025-02-07 | Stop reason: HOSPADM

## 2025-02-06 RX ORDER — LEVETIRACETAM 500 MG/1
500 TABLET ORAL 2 TIMES DAILY
Start: 2025-02-06 | End: 2025-02-13

## 2025-02-06 RX ORDER — OXYMETAZOLINE HCL 0.05 %
2 SPRAY, NON-AEROSOL (ML) NASAL EVERY 12 HOURS PRN
Start: 2025-02-06 | End: 2025-02-10

## 2025-02-06 RX ORDER — TALC
3 POWDER (GRAM) TOPICAL NIGHTLY
Status: DISCONTINUED | OUTPATIENT
Start: 2025-02-06 | End: 2025-02-07 | Stop reason: HOSPADM

## 2025-02-06 RX ORDER — LABETALOL HYDROCHLORIDE 5 MG/ML
10 INJECTION, SOLUTION INTRAVENOUS EVERY 10 MIN PRN
Status: DISCONTINUED | OUTPATIENT
Start: 2025-02-06 | End: 2025-02-07 | Stop reason: HOSPADM

## 2025-02-06 RX ORDER — INSULIN LISPRO 100 [IU]/ML
0-5 INJECTION, SOLUTION INTRAVENOUS; SUBCUTANEOUS
Status: DISCONTINUED | OUTPATIENT
Start: 2025-02-06 | End: 2025-02-07 | Stop reason: HOSPADM

## 2025-02-06 RX ORDER — ACETAMINOPHEN 325 MG/1
975 TABLET ORAL EVERY 8 HOURS PRN
Status: DISCONTINUED | OUTPATIENT
Start: 2025-02-06 | End: 2025-02-07 | Stop reason: HOSPADM

## 2025-02-06 RX ORDER — RIVASTIGMINE TARTRATE 3 MG/1
3 CAPSULE ORAL 2 TIMES DAILY
COMMUNITY

## 2025-02-06 RX ORDER — TRANEXAMIC ACID 10 MG/ML
1000 INJECTION, SOLUTION INTRAVENOUS ONCE
Status: DISCONTINUED | OUTPATIENT
Start: 2025-02-06 | End: 2025-02-06

## 2025-02-06 RX ORDER — DEXTROSE 50 % IN WATER (D50W) INTRAVENOUS SYRINGE
12.5
Status: DISCONTINUED | OUTPATIENT
Start: 2025-02-06 | End: 2025-02-07 | Stop reason: HOSPADM

## 2025-02-06 RX ORDER — CARVEDILOL 6.25 MG/1
6.25 TABLET ORAL 2 TIMES DAILY
COMMUNITY

## 2025-02-06 RX ORDER — TRANEXAMIC ACID 100 MG/ML
500 INJECTION, SOLUTION INTRAVENOUS ONCE
Status: COMPLETED | OUTPATIENT
Start: 2025-02-06 | End: 2025-02-06

## 2025-02-06 RX ORDER — MUPIROCIN 20 MG/G
OINTMENT TOPICAL 3 TIMES DAILY
Status: DISCONTINUED | OUTPATIENT
Start: 2025-02-06 | End: 2025-02-07 | Stop reason: HOSPADM

## 2025-02-06 RX ORDER — BISACODYL 10 MG/1
10 SUPPOSITORY RECTAL DAILY PRN
COMMUNITY

## 2025-02-06 RX ORDER — PANTOPRAZOLE SODIUM 40 MG/10ML
40 INJECTION, POWDER, LYOPHILIZED, FOR SOLUTION INTRAVENOUS DAILY
Status: DISCONTINUED | OUTPATIENT
Start: 2025-02-06 | End: 2025-02-06

## 2025-02-06 RX ORDER — PANTOPRAZOLE SODIUM 20 MG/1
20 TABLET, DELAYED RELEASE ORAL
Status: DISCONTINUED | OUTPATIENT
Start: 2025-02-07 | End: 2025-02-07 | Stop reason: HOSPADM

## 2025-02-06 RX ORDER — POLYETHYLENE GLYCOL 3350 17 G/17G
17 POWDER, FOR SOLUTION ORAL DAILY PRN
Status: DISCONTINUED | OUTPATIENT
Start: 2025-02-06 | End: 2025-02-07 | Stop reason: HOSPADM

## 2025-02-06 RX ORDER — HYDRALAZINE HYDROCHLORIDE 20 MG/ML
10 INJECTION INTRAMUSCULAR; INTRAVENOUS
Status: DISCONTINUED | OUTPATIENT
Start: 2025-02-06 | End: 2025-02-07 | Stop reason: HOSPADM

## 2025-02-06 RX ORDER — OXYMETAZOLINE HCL 0.05 %
2 SPRAY, NON-AEROSOL (ML) NASAL EVERY 12 HOURS PRN
Status: DISCONTINUED | OUTPATIENT
Start: 2025-02-06 | End: 2025-02-06 | Stop reason: SDUPTHER

## 2025-02-06 RX ORDER — OXYMETAZOLINE HCL 0.05 %
SPRAY, NON-AEROSOL (ML) NASAL
Status: COMPLETED
Start: 2025-02-06 | End: 2025-02-06

## 2025-02-06 RX ORDER — POTASSIUM CHLORIDE 14.9 MG/ML
20 INJECTION INTRAVENOUS
Status: COMPLETED | OUTPATIENT
Start: 2025-02-06 | End: 2025-02-06

## 2025-02-06 RX ORDER — BISACODYL 10 MG/1
10 SUPPOSITORY RECTAL DAILY PRN
Status: DISCONTINUED | OUTPATIENT
Start: 2025-02-06 | End: 2025-02-07 | Stop reason: HOSPADM

## 2025-02-06 RX ORDER — MIRTAZAPINE 15 MG/1
7.5 TABLET, FILM COATED ORAL NIGHTLY
Status: DISCONTINUED | OUTPATIENT
Start: 2025-02-06 | End: 2025-02-07 | Stop reason: HOSPADM

## 2025-02-06 RX ADMIN — IPRATROPIUM BROMIDE 0.5 MG: 0.5 SOLUTION RESPIRATORY (INHALATION) at 21:29

## 2025-02-06 RX ADMIN — OXYMETAZOLINE HYDROCHLORIDE 2 SPRAY: 0.5 SPRAY NASAL at 00:45

## 2025-02-06 RX ADMIN — Medication 2 SPRAY: at 00:45

## 2025-02-06 RX ADMIN — RIVASTIGMINE TARTRATE 3 MG: 1.5 CAPSULE ORAL at 20:11

## 2025-02-06 RX ADMIN — SODIUM CHLORIDE 500 ML: 9 INJECTION, SOLUTION INTRAVENOUS at 07:02

## 2025-02-06 RX ADMIN — GUAIFENESIN 600 MG: 600 TABLET ORAL at 11:44

## 2025-02-06 RX ADMIN — SILVER NITRATE APPLICATORS 1 APPLICATOR: 25; 75 STICK TOPICAL at 05:00

## 2025-02-06 RX ADMIN — MUPIROCIN: 20 OINTMENT TOPICAL at 14:59

## 2025-02-06 RX ADMIN — Medication 3 MG: at 20:12

## 2025-02-06 RX ADMIN — POTASSIUM CHLORIDE 20 MEQ: 14.9 INJECTION, SOLUTION INTRAVENOUS at 06:45

## 2025-02-06 RX ADMIN — MIRTAZAPINE 7.5 MG: 15 TABLET, FILM COATED ORAL at 20:10

## 2025-02-06 RX ADMIN — ATORVASTATIN CALCIUM 40 MG: 40 TABLET, FILM COATED ORAL at 20:12

## 2025-02-06 RX ADMIN — ACETAMINOPHEN 975 MG: 325 TABLET ORAL at 20:12

## 2025-02-06 RX ADMIN — LEVETIRACETAM 500 MG: 500 TABLET, FILM COATED ORAL at 14:59

## 2025-02-06 RX ADMIN — PANTOPRAZOLE SODIUM 40 MG: 40 INJECTION, POWDER, FOR SOLUTION INTRAVENOUS at 11:43

## 2025-02-06 RX ADMIN — RIVASTIGMINE TARTRATE 3 MG: 1.5 CAPSULE ORAL at 13:30

## 2025-02-06 RX ADMIN — LEVETIRACETAM 500 MG: 500 TABLET, FILM COATED ORAL at 20:12

## 2025-02-06 RX ADMIN — ACETAMINOPHEN 975 MG: 325 TABLET ORAL at 11:41

## 2025-02-06 RX ADMIN — MUPIROCIN: 20 OINTMENT TOPICAL at 20:25

## 2025-02-06 RX ADMIN — TRANEXAMIC ACID 500 MG: 1 INJECTION, SOLUTION INTRAVENOUS at 03:43

## 2025-02-06 RX ADMIN — MAGNESIUM SULFATE HEPTAHYDRATE 2 G: 40 INJECTION, SOLUTION INTRAVENOUS at 06:44

## 2025-02-06 RX ADMIN — IPRATROPIUM BROMIDE 0.5 MG: 0.5 SOLUTION RESPIRATORY (INHALATION) at 13:15

## 2025-02-06 RX ADMIN — MUPIROCIN: 20 OINTMENT TOPICAL at 09:32

## 2025-02-06 RX ADMIN — POTASSIUM CHLORIDE 20 MEQ: 14.9 INJECTION, SOLUTION INTRAVENOUS at 09:31

## 2025-02-06 RX ADMIN — TRANEXAMIC ACID 1000 MG: 1 INJECTION, SOLUTION INTRAVENOUS at 01:06

## 2025-02-06 SDOH — SOCIAL STABILITY: SOCIAL INSECURITY: ARE THERE ANY APPARENT SIGNS OF INJURIES/BEHAVIORS THAT COULD BE RELATED TO ABUSE/NEGLECT?: UNABLE TO ASSESS

## 2025-02-06 SDOH — SOCIAL STABILITY: SOCIAL INSECURITY
WITHIN THE LAST YEAR, HAVE YOU BEEN KICKED, HIT, SLAPPED, OR OTHERWISE PHYSICALLY HURT BY YOUR PARTNER OR EX-PARTNER?: PATIENT UNABLE TO ANSWER

## 2025-02-06 SDOH — SOCIAL STABILITY: SOCIAL INSECURITY: WERE YOU ABLE TO COMPLETE ALL THE BEHAVIORAL HEALTH SCREENINGS?: NO

## 2025-02-06 SDOH — SOCIAL STABILITY: SOCIAL INSECURITY: HAVE YOU HAD ANY THOUGHTS OF HARMING ANYONE ELSE?: UNABLE TO ASSESS

## 2025-02-06 SDOH — SOCIAL STABILITY: SOCIAL INSECURITY: ARE YOU OR HAVE YOU BEEN THREATENED OR ABUSED PHYSICALLY, EMOTIONALLY, OR SEXUALLY BY ANYONE?: UNABLE TO ASSESS

## 2025-02-06 SDOH — ECONOMIC STABILITY: INCOME INSECURITY
IN THE PAST 12 MONTHS HAS THE ELECTRIC, GAS, OIL, OR WATER COMPANY THREATENED TO SHUT OFF SERVICES IN YOUR HOME?: PATIENT UNABLE TO ANSWER

## 2025-02-06 SDOH — SOCIAL STABILITY: SOCIAL INSECURITY: DO YOU FEEL UNSAFE GOING BACK TO THE PLACE WHERE YOU ARE LIVING?: UNABLE TO ASSESS

## 2025-02-06 SDOH — SOCIAL STABILITY: SOCIAL INSECURITY: HAS ANYONE EVER THREATENED TO HURT YOUR FAMILY OR YOUR PETS?: UNABLE TO ASSESS

## 2025-02-06 SDOH — ECONOMIC STABILITY: FOOD INSECURITY
WITHIN THE PAST 12 MONTHS, THE FOOD YOU BOUGHT JUST DIDN'T LAST AND YOU DIDN'T HAVE MONEY TO GET MORE.: PATIENT UNABLE TO ANSWER

## 2025-02-06 SDOH — HEALTH STABILITY: MENTAL HEALTH
DO YOU FEEL STRESS - TENSE, RESTLESS, NERVOUS, OR ANXIOUS, OR UNABLE TO SLEEP AT NIGHT BECAUSE YOUR MIND IS TROUBLED ALL THE TIME - THESE DAYS?: PATIENT UNABLE TO ANSWER

## 2025-02-06 SDOH — HEALTH STABILITY: PHYSICAL HEALTH
ON AVERAGE, HOW MANY DAYS PER WEEK DO YOU ENGAGE IN MODERATE TO STRENUOUS EXERCISE (LIKE A BRISK WALK)?: PATIENT UNABLE TO ANSWER

## 2025-02-06 SDOH — HEALTH STABILITY: PHYSICAL HEALTH: ON AVERAGE, HOW MANY MINUTES DO YOU ENGAGE IN EXERCISE AT THIS LEVEL?: PATIENT UNABLE TO ANSWER

## 2025-02-06 SDOH — ECONOMIC STABILITY: HOUSING INSECURITY
IN THE LAST 12 MONTHS, WAS THERE A TIME WHEN YOU WERE NOT ABLE TO PAY THE MORTGAGE OR RENT ON TIME?: PATIENT UNABLE TO ANSWER

## 2025-02-06 SDOH — SOCIAL STABILITY: SOCIAL INSECURITY: DO YOU FEEL ANYONE HAS EXPLOITED OR TAKEN ADVANTAGE OF YOU FINANCIALLY OR OF YOUR PERSONAL PROPERTY?: UNABLE TO ASSESS

## 2025-02-06 SDOH — SOCIAL STABILITY: SOCIAL INSECURITY: HAVE YOU HAD THOUGHTS OF HARMING ANYONE ELSE?: UNABLE TO ASSESS

## 2025-02-06 SDOH — ECONOMIC STABILITY: HOUSING INSECURITY: AT ANY TIME IN THE PAST 12 MONTHS, WERE YOU HOMELESS OR LIVING IN A SHELTER (INCLUDING NOW)?: PATIENT UNABLE TO ANSWER

## 2025-02-06 SDOH — ECONOMIC STABILITY: FOOD INSECURITY
HOW HARD IS IT FOR YOU TO PAY FOR THE VERY BASICS LIKE FOOD, HOUSING, MEDICAL CARE, AND HEATING?: PATIENT UNABLE TO ANSWER

## 2025-02-06 SDOH — SOCIAL STABILITY: SOCIAL INSECURITY
WITHIN THE LAST YEAR, HAVE YOU BEEN RAPED OR FORCED TO HAVE ANY KIND OF SEXUAL ACTIVITY BY YOUR PARTNER OR EX-PARTNER?: PATIENT UNABLE TO ANSWER

## 2025-02-06 SDOH — ECONOMIC STABILITY: HOUSING INSECURITY: IN THE PAST 12 MONTHS, HOW MANY TIMES HAVE YOU MOVED WHERE YOU WERE LIVING?: 0

## 2025-02-06 SDOH — SOCIAL STABILITY: SOCIAL INSECURITY: WITHIN THE LAST YEAR, HAVE YOU BEEN AFRAID OF YOUR PARTNER OR EX-PARTNER?: PATIENT UNABLE TO ANSWER

## 2025-02-06 SDOH — ECONOMIC STABILITY: TRANSPORTATION INSECURITY
IN THE PAST 12 MONTHS, HAS LACK OF TRANSPORTATION KEPT YOU FROM MEDICAL APPOINTMENTS OR FROM GETTING MEDICATIONS?: PATIENT UNABLE TO ANSWER

## 2025-02-06 SDOH — SOCIAL STABILITY: SOCIAL INSECURITY: DOES ANYONE TRY TO KEEP YOU FROM HAVING/CONTACTING OTHER FRIENDS OR DOING THINGS OUTSIDE YOUR HOME?: UNABLE TO ASSESS

## 2025-02-06 SDOH — SOCIAL STABILITY: SOCIAL INSECURITY: ABUSE: ADULT

## 2025-02-06 SDOH — SOCIAL STABILITY: SOCIAL INSECURITY
WITHIN THE LAST YEAR, HAVE YOU BEEN HUMILIATED OR EMOTIONALLY ABUSED IN OTHER WAYS BY YOUR PARTNER OR EX-PARTNER?: PATIENT UNABLE TO ANSWER

## 2025-02-06 SDOH — ECONOMIC STABILITY: FOOD INSECURITY
WITHIN THE PAST 12 MONTHS, YOU WORRIED THAT YOUR FOOD WOULD RUN OUT BEFORE YOU GOT THE MONEY TO BUY MORE.: PATIENT UNABLE TO ANSWER

## 2025-02-06 ASSESSMENT — COGNITIVE AND FUNCTIONAL STATUS - GENERAL
DRESSING REGULAR LOWER BODY CLOTHING: TOTAL
HELP NEEDED FOR BATHING: TOTAL
TURNING FROM BACK TO SIDE WHILE IN FLAT BAD: TOTAL
MOVING FROM LYING ON BACK TO SITTING ON SIDE OF FLAT BED WITH BEDRAILS: TOTAL
MOVING TO AND FROM BED TO CHAIR: TOTAL
DAILY ACTIVITIY SCORE: 6
DRESSING REGULAR LOWER BODY CLOTHING: TOTAL
DRESSING REGULAR UPPER BODY CLOTHING: TOTAL
TOILETING: TOTAL
HELP NEEDED FOR BATHING: TOTAL
EATING MEALS: TOTAL
CLIMB 3 TO 5 STEPS WITH RAILING: TOTAL
TOILETING: TOTAL
PERSONAL GROOMING: TOTAL
MOBILITY SCORE: 6
CLIMB 3 TO 5 STEPS WITH RAILING: TOTAL
MOVING TO AND FROM BED TO CHAIR: TOTAL
WALKING IN HOSPITAL ROOM: TOTAL
MOBILITY SCORE: 6
EATING MEALS: A LOT
TURNING FROM BACK TO SIDE WHILE IN FLAT BAD: TOTAL
MOVING FROM LYING ON BACK TO SITTING ON SIDE OF FLAT BED WITH BEDRAILS: TOTAL
PERSONAL GROOMING: TOTAL
WALKING IN HOSPITAL ROOM: TOTAL
PATIENT BASELINE BEDBOUND: NO
STANDING UP FROM CHAIR USING ARMS: TOTAL
DRESSING REGULAR UPPER BODY CLOTHING: TOTAL
DAILY ACTIVITIY SCORE: 7
STANDING UP FROM CHAIR USING ARMS: TOTAL

## 2025-02-06 ASSESSMENT — LIFESTYLE VARIABLES
AUDIT-C TOTAL SCORE: -1
HOW OFTEN DO YOU HAVE 6 OR MORE DRINKS ON ONE OCCASION: PATIENT UNABLE TO ANSWER
HOW OFTEN DO YOU HAVE A DRINK CONTAINING ALCOHOL: PATIENT UNABLE TO ANSWER
SKIP TO QUESTIONS 9-10: 0
AUDIT-C TOTAL SCORE: -1
HOW MANY STANDARD DRINKS CONTAINING ALCOHOL DO YOU HAVE ON A TYPICAL DAY: PATIENT UNABLE TO ANSWER

## 2025-02-06 ASSESSMENT — ACTIVITIES OF DAILY LIVING (ADL)
JUDGMENT_ADEQUATE_SAFELY_COMPLETE_DAILY_ACTIVITIES: NO
ASSISTIVE_DEVICE: WALKER
TOILETING: DEPENDENT
ADEQUATE_TO_COMPLETE_ADL: UNABLE TO ASSESS
HEARING - LEFT EAR: FUNCTIONAL
GROOMING: DEPENDENT
LACK_OF_TRANSPORTATION: PATIENT UNABLE TO ANSWER
DRESSING YOURSELF: DEPENDENT
BATHING: DEPENDENT
PATIENT'S MEMORY ADEQUATE TO SAFELY COMPLETE DAILY ACTIVITIES?: NO
WALKS IN HOME: DEPENDENT
FEEDING YOURSELF: DEPENDENT
HEARING - RIGHT EAR: FUNCTIONAL

## 2025-02-06 ASSESSMENT — PAIN - FUNCTIONAL ASSESSMENT
PAIN_FUNCTIONAL_ASSESSMENT: CPOT (CRITICAL CARE PAIN OBSERVATION TOOL)
PAIN_FUNCTIONAL_ASSESSMENT: 0-10
PAIN_FUNCTIONAL_ASSESSMENT: CPOT (CRITICAL CARE PAIN OBSERVATION TOOL)
PAIN_FUNCTIONAL_ASSESSMENT: CPOT (CRITICAL CARE PAIN OBSERVATION TOOL)
PAIN_FUNCTIONAL_ASSESSMENT: 0-10

## 2025-02-06 ASSESSMENT — PAIN SCALES - PAIN ASSESSMENT IN ADVANCED DEMENTIA (PAINAD)
CONSOLABILITY: DISTRACTED OR REASSURED BY VOICE/TOUCH
TOTALSCORE: 5
FACIALEXPRESSION: SAD, FRIGHTENED, FROWN
BODYLANGUAGE: TENSE, DISTRESSED PACING, FIDGETING
NEGVOCALIZATION: OCCASIONAL MOAN/GROAN, LOW SPEECH, NEGATIVE/DISAPPROVING QUALITY
BREATHING: OCCASIONAL LABORED BREATHING, SHORT PERIOD OF HYPERVENTILATION
TOTALSCORE: MEDICATION (SEE MAR)

## 2025-02-06 ASSESSMENT — PATIENT HEALTH QUESTIONNAIRE - PHQ9: 2. FEELING DOWN, DEPRESSED OR HOPELESS: NOT AT ALL

## 2025-02-06 ASSESSMENT — PAIN SCALES - GENERAL
PAINLEVEL_OUTOF10: 0 - NO PAIN
PAINLEVEL_OUTOF10: 0 - NO PAIN

## 2025-02-06 ASSESSMENT — PAIN DESCRIPTION - LOCATION: LOCATION: HEAD

## 2025-02-06 ASSESSMENT — PAIN DESCRIPTION - ORIENTATION: ORIENTATION: RIGHT

## 2025-02-06 NOTE — CONSULTS
"Inpatient consult to Neurology  Consult performed by: Alejandrina Connelly, CELESTINE-CNP  Consult ordered by: Elias Delgado DO          History Of Present Illness  Bret Harmon is a 74 y.o. male presenting with fall. Per staff at [facility], pt had an unwitnessed fall with head injury. He had a large laceration on his forehead and a nosebleed. He was alert and oriented x 1, which is also his baseline. EMS was called and they put a cervical collar in place. In the ED, his blood pressure was 188/94. WBC count was 12.4. CT head showed left subdural hematoma and right frontoparietal subdural fluid collection that is also new/enlarged that may represent hygroma and hematoma component. Subarachnoid hemorrhage as well. He also had a distal nasal bone fracture. This case was discussed with surgery, Dr. Andrews as well as with neuro surgery who recommended a stability scan in 6 hours. Neurology was also consulted for a \"brain bleed.\"   Pt was seen at bedside with Dr. Hughes. Nurse was also at bedside. Pt was resting in bed, awake and making eye contact. He denies headache, but he also has dementia, so accuracy of his answers are unclear.    Past Medical History  Past Medical History:   Diagnosis Date    COPD (chronic obstructive pulmonary disease) (Multi)     Coronary artery disease     Dementia     Depression     Dysphagia     Hyperlipidemia     Hypertension     Insomnia     Muscle weakness     Peripheral vascular disease (CMS-HCC)     Prediabetes     Protein calorie malnutrition (Multi)      Surgical History  History reviewed. No pertinent surgical history.  Social History  Social History     Tobacco Use    Smoking status: Former     Types: Cigarettes   Substance Use Topics    Alcohol use: Not Currently    Drug use: Never     Allergies  Bee pollen  Medications Prior to Admission   Medication Sig Dispense Refill Last Dose/Taking    aspirin 81 mg chewable tablet Chew 1 tablet (81 mg) once daily.   2/5/2025 Morning    atorvastatin " (Lipitor) 40 mg tablet Take 1 tablet (40 mg) by mouth once daily.   2/5/2025 Morning    bisacodyl (Dulcolax) 10 mg suppository Insert 1 suppository (10 mg) into the rectum once daily as needed for constipation.   Past Week    carvedilol (Coreg) 6.25 mg tablet Take 1 tablet (6.25 mg) by mouth 2 times a day.   2/5/2025 Bedtime    cilostazol (Pletal) 100 mg tablet Take 1 tablet (100 mg) by mouth 2 times a day.   2/5/2025 Bedtime    clopidogrel (Plavix) 75 mg tablet Take 1 tablet (75 mg) by mouth once daily.   2/5/2025 Morning    cyanocobalamin (Vitamin B-12) 250 mcg tablet Take 1 tablet (250 mcg) by mouth once daily.   2/5/2025 Morning    isosorbide mononitrate ER (Imdur) 30 mg 24 hr tablet Take 1 tablet (30 mg) by mouth once daily. Do not crush or chew.   2/5/2025 Morning    magnesium hydroxide (Milk of Magnesia) 400 mg/5 mL suspension Take 30 mL by mouth once daily as needed for constipation.   Past Week    melatonin 3 mg tablet Take 1 tablet (3 mg) by mouth once daily at bedtime.   2/5/2025 Bedtime    mirtazapine (Remeron) 7.5 mg tablet Take 1 tablet (7.5 mg) by mouth once daily at bedtime.   2/5/2025 Bedtime    nitroglycerin (Nitrostat) 0.4 mg SL tablet Place 1 tablet (0.4 mg) under the tongue every 5 minutes if needed for chest pain.   Past Week    pantoprazole (ProtoNix) 20 mg EC tablet Take 1 tablet (20 mg) by mouth once daily in the morning. Take before meals. Do not crush, chew, or split.   2/5/2025 Morning    rivastigmine (Exelon) 3 mg capsule Take 1 capsule (3 mg) by mouth 2 times a day.   2/5/2025 Bedtime    tiotropium (Spiriva Respimat) 2.5 mcg/actuation inhaler Inhale 2 puffs once daily.   2/5/2025 Morning    lisinopril 2.5 mg tablet Take 1 tablet (2.5 mg) by mouth once daily. (Patient not taking: Reported on 2/6/2025)   Not Taking       Review of Systems  Neurological Exam  Mental Status   Oriented only to person.  Naming and repetition intact.    Cranial Nerves  CN VII: Full and symmetric facial  "movement.  CN XII: Tongue midline without atrophy or fasciculations.  Right eye swollen shut and painful when attempting to manually open.  No blink to threat on left side..    Motor    Left arm movement not as brisk as right arm.   Hand grasps strong and equal. Both legs antigravity..    Sensory  Light touch is normal in upper and lower extremities.     Physical Exam  Last Recorded Vitals  Blood pressure 95/57, pulse 98, temperature 36.4 °C (97.5 °F), temperature source Temporal, resp. rate 22, height 1.803 m (5' 11\"), weight 69.1 kg (152 lb 5.4 oz), SpO2 96%.    Relevant Results  Scheduled medications  atorvastatin, 40 mg, oral, Nightly  carvedilol, 6.25 mg, oral, BID  [Held by provider] cilostazol, 100 mg, oral, BID  insulin lispro, 0-5 Units, subcutaneous, q4h  ipratropium, 0.5 mg, nebulization, q6h  isosorbide mononitrate ER, 30 mg, oral, Daily  melatonin, 3 mg, oral, Nightly  mirtazapine, 7.5 mg, oral, Nightly  mupirocin, , Topical, TID  [START ON 2/7/2025] pantoprazole, 20 mg, oral, Daily before breakfast  pantoprazole, 40 mg, intravenous, Daily  rivastigmine, 3 mg, oral, BID      Continuous medications     PRN medications  PRN medications: acetaminophen, bisacodyl, dextrose, dextrose, glucagon, glucagon, guaiFENesin, hydrALAZINE, labetaloL, oxymetazoline, polyethylene glycol  Results for orders placed or performed during the hospital encounter of 02/05/25 (from the past 24 hours)   CBC and Auto Differential   Result Value Ref Range    WBC 12.4 (H) 4.4 - 11.3 x10*3/uL    nRBC 0.0 0.0 - 0.0 /100 WBCs    RBC 4.26 (L) 4.50 - 5.90 x10*6/uL    Hemoglobin 12.2 (L) 13.5 - 17.5 g/dL    Hematocrit 38.2 (L) 41.0 - 52.0 %    MCV 90 80 - 100 fL    MCH 28.6 26.0 - 34.0 pg    MCHC 31.9 (L) 32.0 - 36.0 g/dL    RDW 15.2 (H) 11.5 - 14.5 %    Platelets 310 150 - 450 x10*3/uL    Neutrophils % 66.8 40.0 - 80.0 %    Immature Granulocytes %, Automated 0.3 0.0 - 0.9 %    Lymphocytes % 22.8 13.0 - 44.0 %    Monocytes % 8.3 2.0 - " 10.0 %    Eosinophils % 1.6 0.0 - 6.0 %    Basophils % 0.2 0.0 - 2.0 %    Neutrophils Absolute 8.30 (H) 1.60 - 5.50 x10*3/uL    Immature Granulocytes Absolute, Automated 0.04 0.00 - 0.50 x10*3/uL    Lymphocytes Absolute 2.83 0.80 - 3.00 x10*3/uL    Monocytes Absolute 1.03 (H) 0.05 - 0.80 x10*3/uL    Eosinophils Absolute 0.20 0.00 - 0.40 x10*3/uL    Basophils Absolute 0.03 0.00 - 0.10 x10*3/uL   Comprehensive Metabolic Panel   Result Value Ref Range    Glucose 118 (H) 74 - 99 mg/dL    Sodium 139 136 - 145 mmol/L    Potassium 6.1 (HH) 3.5 - 5.3 mmol/L    Chloride 108 (H) 98 - 107 mmol/L    Bicarbonate 25 21 - 32 mmol/L    Anion Gap 12 10 - 20 mmol/L    Urea Nitrogen 16 6 - 23 mg/dL    Creatinine 1.09 0.50 - 1.30 mg/dL    eGFR 70 >60 mL/min/1.73m*2    Calcium 9.4 8.6 - 10.3 mg/dL    Albumin 3.9 3.4 - 5.0 g/dL    Alkaline Phosphatase 83 33 - 136 U/L    Total Protein 8.0 6.4 - 8.2 g/dL    AST 30 9 - 39 U/L    Bilirubin, Total 0.4 0.0 - 1.2 mg/dL    ALT 15 10 - 52 U/L   Magnesium   Result Value Ref Range    Magnesium 1.87 1.60 - 2.40 mg/dL   Troponin I, High Sensitivity, Initial   Result Value Ref Range    Troponin I, High Sensitivity 10 0 - 20 ng/L   Protime-INR   Result Value Ref Range    Protime 13.5 (H) 9.8 - 12.8 seconds    INR 1.2 (H) 0.9 - 1.1   Type And Screen   Result Value Ref Range    ABO TYPE AB     Rh TYPE POS     ANTIBODY SCREEN NEG    SST TOP   Result Value Ref Range    Extra Tube Hold for add-ons.    Procalcitonin   Result Value Ref Range    Procalcitonin 0.04 <=0.07 ng/mL   Sedimentation Rate   Result Value Ref Range    Sedimentation Rate 63 (H) 0 - 20 mm/h   ECG 12 lead   Result Value Ref Range    Ventricular Rate 107 BPM    Atrial Rate 107 BPM    RI Interval 130 ms    QRS Duration 126 ms    QT Interval 376 ms    QTC Calculation(Bazett) 501 ms    P Axis 69 degrees    R Axis -86 degrees    T Axis 72 degrees    QRS Count 17 beats    Q Onset 194 ms    P Onset 129 ms    P Offset 171 ms    T Offset 382 ms     QTC Fredericia 456 ms   ECG 12 lead   Result Value Ref Range    Ventricular Rate 110 BPM    Atrial Rate 110 BPM    [place] Interval 150 ms    QRS Duration 136 ms    QT Interval 366 ms    QTC Calculation(Bazett) 495 ms    P Axis 73 degrees    R Axis 264 degrees    T Axis 72 degrees    QRS Count 18 beats    Q Onset 191 ms    P Onset 116 ms    P Offset 170 ms    T Offset 374 ms    QTC Fredericia 448 ms   Troponin, High Sensitivity, 1 Hour   Result Value Ref Range    Troponin I, High Sensitivity 11 0 - 20 ng/L   Potassium   Result Value Ref Range    Potassium 3.7 3.5 - 5.3 mmol/L   C-reactive protein   Result Value Ref Range    C-Reactive Protein 1.96 (H) <1.00 mg/dL   Lactate   Result Value Ref Range    Lactate 1.6 0.4 - 2.0 mmol/L   Comprehensive metabolic panel   Result Value Ref Range    Glucose 130 (H) 74 - 99 mg/dL    Sodium 142 136 - 145 mmol/L    Potassium 3.6 3.5 - 5.3 mmol/L    Chloride 109 (H) 98 - 107 mmol/L    Bicarbonate 26 21 - 32 mmol/L    Anion Gap 11 10 - 20 mmol/L    Urea Nitrogen 16 6 - 23 mg/dL    Creatinine 1.09 0.50 - 1.30 mg/dL    eGFR 70 >60 mL/min/1.73m*2    Calcium 9.5 8.6 - 10.3 mg/dL    Albumin 3.7 3.4 - 5.0 g/dL    Alkaline Phosphatase 80 33 - 136 U/L    Total Protein 7.3 6.4 - 8.2 g/dL    AST 20 9 - 39 U/L    Bilirubin, Total 0.4 0.0 - 1.2 mg/dL    ALT 14 10 - 52 U/L   POCT GLUCOSE   Result Value Ref Range    POCT Glucose 112 (H) 74 - 99 mg/dL   CBC and Auto Differential   Result Value Ref Range    WBC 13.4 (H) 4.4 - 11.3 x10*3/uL    nRBC 0.0 0.0 - 0.0 /100 WBCs    RBC 3.90 (L) 4.50 - 5.90 x10*6/uL    Hemoglobin 10.9 (L) 13.5 - 17.5 g/dL    Hematocrit 34.4 (L) 41.0 - 52.0 %    MCV 88 80 - 100 fL    MCH 27.9 26.0 - 34.0 pg    MCHC 31.7 (L) 32.0 - 36.0 g/dL    RDW 15.2 (H) 11.5 - 14.5 %    Platelets 308 150 - 450 x10*3/uL    Neutrophils % 78.6 40.0 - 80.0 %    Immature Granulocytes %, Automated 0.4 0.0 - 0.9 %    Lymphocytes % 13.0 13.0 - 44.0 %    Monocytes % 7.0 2.0 - 10.0 %    Eosinophils %  0.6 0.0 - 6.0 %    Basophils % 0.4 0.0 - 2.0 %    Neutrophils Absolute 10.55 (H) 1.60 - 5.50 x10*3/uL    Immature Granulocytes Absolute, Automated 0.05 0.00 - 0.50 x10*3/uL    Lymphocytes Absolute 1.75 0.80 - 3.00 x10*3/uL    Monocytes Absolute 0.94 (H) 0.05 - 0.80 x10*3/uL    Eosinophils Absolute 0.08 0.00 - 0.40 x10*3/uL    Basophils Absolute 0.05 0.00 - 0.10 x10*3/uL   Light Blue Top   Result Value Ref Range    Extra Tube Hold for add-ons.    PST Top   Result Value Ref Range    Extra Tube Hold for add-ons.    Comprehensive metabolic panel   Result Value Ref Range    Glucose 130 (H) 74 - 99 mg/dL    Sodium 142 136 - 145 mmol/L    Potassium 3.7 3.5 - 5.3 mmol/L    Chloride 110 (H) 98 - 107 mmol/L    Bicarbonate 23 21 - 32 mmol/L    Anion Gap 13 10 - 20 mmol/L    Urea Nitrogen 16 6 - 23 mg/dL    Creatinine 0.98 0.50 - 1.30 mg/dL    eGFR 80 >60 mL/min/1.73m*2    Calcium 9.5 8.6 - 10.3 mg/dL    Albumin 3.6 3.4 - 5.0 g/dL    Alkaline Phosphatase 76 33 - 136 U/L    Total Protein 7.3 6.4 - 8.2 g/dL    AST 26 9 - 39 U/L    Bilirubin, Total 0.4 0.0 - 1.2 mg/dL    ALT 15 10 - 52 U/L   Magnesium   Result Value Ref Range    Magnesium 1.65 1.60 - 2.40 mg/dL   POCT GLUCOSE   Result Value Ref Range    POCT Glucose 123 (H) 74 - 99 mg/dL   POCT GLUCOSE   Result Value Ref Range    POCT Glucose 101 (H) 74 - 99 mg/dL            I have personally reviewed the following imaging results ECG 12 lead    Result Date: 2/6/2025  Sinus tachycardia with occasional Premature ventricular complexes Right bundle branch block Possible Lateral infarct , age undetermined Inferior infarct , age undetermined Abnormal ECG When compared with ECG of 05-FEB-2025 22:43, Significant changes have occurred    CT head wo IV contrast    Result Date: 2/6/2025  Interpreted By:  Bhanu Kerr, STUDY: CT HEAD WO IV CONTRAST;  2/6/2025 6:12 am   INDICATION: Signs/Symptoms:stability scan for head bleeds.     COMPARISON: CT head and face dated 02/05/2025.   ACCESSION  NUMBER(S): [id number]   ORDERING CLINICIAN: YOVANI MEDELLIN   TECHNIQUE: Noncontrast axial CT scan of head was performed.   FINDINGS: There is a tiny anterior parafalcine subdural hematoma measuring up to 3 mm in thickness (series 202, image 54), similar to prior examination with likely slight interval redistribution. There is additional extra-axial hemorrhage suggested along the anterior inferior left frontal region that may represent combination of subarachnoid and subdural hemorrhage with small cortical contusion also not excluded (series 202, image 36), also unchanged from prior examination.   There is a hyperdense/isodense left convexity subdural hematoma component measuring 3 mm in thickness (series 202, Image 48), new/enlarged in the interim.   A hypodense subdural fluid collection along the right cerebral convexity measures up to 6 mm in thickness (series 203, image 51), also new in the interim.   There is additional trace subarachnoid hemorrhage suggested such as within the perisylvian regions bilaterally, similar in appearance to prior exam. There is no new intraparenchymal hemorrhage identified.   Similar appearing encephalomalacia within the right occipital lobe and right posterior parietal lobe. Similar appearing chronic small vessel ischemic change. No CT apparent acute infarct.   There is mild sulcal effacement associated with the subdural fluid collections, greatest involving the right frontoparietal convexity. There is minimal leftward bowing of the septum pellucidum measuring 1-2 mm. Basilar cisterns are patent.   No acute calvarial fracture identified. Mastoids are well aerated bilaterally. The visualized paranasal sinuses are well aerated with small amount of fluid within the sphenoid sinus and posterior left ethmoid air cells. Globes and orbits are unremarkable status post bilateral native lens extractions. There is a right frontal scalp hematoma/laceration with decrease in size in the interim.  There is additional soft tissue swelling involving the right preseptal soft tissues and right face.       1. New/enlarging left convexity subdural hematoma component measuring up to 3 mm in thickness. Additionally, there is a hypodense right frontoparietal subdural fluid collection that is also new/enlarged in the interim that may represent a hygroma or hematoma component. There is mild sulcal effacement with no significant midline shift/herniation. 2. Similar appearance of subarachnoid hemorrhage and parafalcine subdural hematoma components. No new intraparenchymal hematoma identified. 3. Unchanged chronic small vessel ischemic disease and remote right parieto-occipital infarct.   MACRO: Critical Finding:  Intracranial hemorrhage with mild or no mass effect. Notification was initiated on 2/6/2025 at 8:32 am by  Bhanu Kerr.  (**-OCF-**)   Signed by: Bhaun Kerr 2/6/2025 8:32 AM Dictation workstation:   [id number]    ECG 12 lead    Result Date: 2/6/2025  Sinus tachycardia Right atrial enlargement Left axis deviation Right bundle branch block Inferior infarct (cited on or before 25-NOV-2014) Abnormal ECG When compared with ECG of 12-JUN-2015 13:48, Right bundle branch block is now Present    XR chest 1 view    Result Date: 2/6/2025  Interpreted By:  Darren Kramer, STUDY: XR CHEST 1 VIEW;  2/5/2025 11:38 pm   INDICATION: Signs/Symptoms:Unwitnessed fall.     COMPARISON: 03/12/2024   ACCESSION NUMBER(S): [id number]   ORDERING CLINICIAN: PREET TEJADA   FINDINGS:         CARDIOMEDIASTINAL SILHOUETTE: Cardiomediastinal silhouette is normal in size and configuration.   LUNGS: Hyperinflation. No localizing infiltrate effusion or pneumothorax.   ABDOMEN: No remarkable upper abdominal findings.   BONES: No acute osseous changes.       1.  Hyperinflation. No focal infiltrate.     MACRO: None   Signed by: Darren Kramer 2/6/2025 12:09 AM Dictation workstation:   [id number]    CT head W O contrast trauma  protocol    Result Date: 2/6/2025  Interpreted By:  Darren Kramer, STUDY: CT HEAD W/O CONTRAST TRAUMA PROTOCOL; CT CERVICAL SPINE WO IV CONTRAST; CT FACIAL BONES WO IV CONTRAST; CT 3D RECONSTRUCTION; 2/5/2025 11:23 pm   INDICATION: Signs/Symptoms:Fall, head injury; Signs/Symptoms:Fall, injury, dementia; Signs/Symptoms:Fall, injury; Signs/Symptoms:trauma   COMPARISON: 04/07/2024   ACCESSION NUMBER(S): [id number]; [id number]; [id number]; [id number]   ORDERING CLINICIAN: PREET TEJADA   TECHNIQUE: Axial noncontrast CT images of head with coronal and sagittal reconstructed images. Axial noncontrast CT images of the cervical spine with coronal and sagittal reconstructed images. Axial CT imaging of the facial bones performed. Three-dimensional reconstructions performed on a separate workstation.   FINDINGS: CT HEAD:   There is a small 3 mm subdural hematoma along the falx on image 24. There is also a trace amount of subarachnoid hemorrhage along the falx along the inferior frontal region on image 16. Trace subarachnoid also noted along the bilateral sylvian fissures.       No mass effect. No midline shift.   Right parietal encephalomalacia.   Global volume loss.   Large right frontal subcutaneous hematoma with soft tissue injury.   No acute skull fracture.   Minimal ethmoid sinus mucosal thickening   Vascular calcification     CT CERVICAL SPINE:   PREVERTEBRAL SOFT TISSUES: Within normal limits.   CRANIOCERVICAL JUNCTION: Intact.   ALIGNMENT:  No traumatic malalignment or traumatic facet widening.   VERTEBRAE:  No acute fracture. Vertebral body heights are maintained.   Mild multilevel degenerative disc disease is seen. No evidence of acute cervical spine fracture.   CT face: Mildly comminuted distal nasal bone fracture. Soft tissue swelling. No orbital fracture.   No zygoma fracture.   Emphysema seen at the lung apices.       1. Global volume loss and chronic small vessel ischemic change. There is a large  right sub cutaneous hematoma. There is also trace subdural and trace subarachnoid hemorrhage. No mass effect or midline shift.     No evidence of acute cervical spine fracture. Mild degenerative changes   Mildly-comminuted distal nasal bone fracture with soft tissue swelling.       Darren Kramer discussed the significance and urgency of this critical finding by epic chat with  clinical team on 2/6/2025 at 12:04 am. (**-RCF-**) Findings:  See findings.     Signed by: Darren Kramer 2/6/2025 12:06 AM Dictation workstation:   [id number]    CT maxillofacial bones wo IV contrast    Result Date: 2/6/2025  Interpreted By:  Darren Kramer, STUDY: CT HEAD W/O CONTRAST TRAUMA PROTOCOL; CT CERVICAL SPINE WO IV CONTRAST; CT FACIAL BONES WO IV CONTRAST; CT 3D RECONSTRUCTION; 2/5/2025 11:23 pm   INDICATION: Signs/Symptoms:Fall, head injury; Signs/Symptoms:Fall, injury, dementia; Signs/Symptoms:Fall, injury; Signs/Symptoms:trauma   COMPARISON: 04/07/2024   ACCESSION NUMBER(S): [id number]; [id number]; [id number]; [id number]   ORDERING CLINICIAN: PREET TEJADA   TECHNIQUE: Axial noncontrast CT images of head with coronal and sagittal reconstructed images. Axial noncontrast CT images of the cervical spine with coronal and sagittal reconstructed images. Axial CT imaging of the facial bones performed. Three-dimensional reconstructions performed on a separate workstation.   FINDINGS: CT HEAD:   There is a small 3 mm subdural hematoma along the falx on image 24. There is also a trace amount of subarachnoid hemorrhage along the falx along the inferior frontal region on image 16. Trace subarachnoid also noted along the bilateral sylvian fissures.       No mass effect. No midline shift.   Right parietal encephalomalacia.   Global volume loss.   Large right frontal subcutaneous hematoma with soft tissue injury.   No acute skull fracture.   Minimal ethmoid sinus mucosal thickening   Vascular calcification     CT CERVICAL SPINE:    PREVERTEBRAL SOFT TISSUES: Within normal limits.   CRANIOCERVICAL JUNCTION: Intact.   ALIGNMENT:  No traumatic malalignment or traumatic facet widening.   VERTEBRAE:  No acute fracture. Vertebral body heights are maintained.   Mild multilevel degenerative disc disease is seen. No evidence of acute cervical spine fracture.   CT face: Mildly comminuted distal nasal bone fracture. Soft tissue swelling. No orbital fracture.   No zygoma fracture.   Emphysema seen at the lung apices.       1. Global volume loss and chronic small vessel ischemic change. There is a large right sub cutaneous hematoma. There is also trace subdural and trace subarachnoid hemorrhage. No mass effect or midline shift.     No evidence of acute cervical spine fracture. Mild degenerative changes   Mildly-comminuted distal nasal bone fracture with soft tissue swelling.       Darren Kramer discussed the significance and urgency of this critical finding by epic chat with  clinical team on 2/6/2025 at 12:04 am. (**-RCF-**) Findings:  See findings.     Signed by: Darren Kramer 2/6/2025 12:06 AM Dictation workstation:   [id number]    CT cervical spine wo IV contrast    Result Date: 2/6/2025  Interpreted By:  Darren Kramer, STUDY: CT HEAD W/O CONTRAST TRAUMA PROTOCOL; CT CERVICAL SPINE WO IV CONTRAST; CT FACIAL BONES WO IV CONTRAST; CT 3D RECONSTRUCTION; 2/5/2025 11:23 pm   INDICATION: Signs/Symptoms:Fall, head injury; Signs/Symptoms:Fall, injury, dementia; Signs/Symptoms:Fall, injury; Signs/Symptoms:trauma   COMPARISON: 04/07/2024   ACCESSION NUMBER(S): [id number]; [id number]; [id number]; [id number]   ORDERING CLINICIAN: PREET TEJADA   TECHNIQUE: Axial noncontrast CT images of head with coronal and sagittal reconstructed images. Axial noncontrast CT images of the cervical spine with coronal and sagittal reconstructed images. Axial CT imaging of the facial bones performed. Three-dimensional reconstructions performed on a separate  workstation.   FINDINGS: CT HEAD:   There is a small 3 mm subdural hematoma along the falx on image 24. There is also a trace amount of subarachnoid hemorrhage along the falx along the inferior frontal region on image 16. Trace subarachnoid also noted along the bilateral sylvian fissures.       No mass effect. No midline shift.   Right parietal encephalomalacia.   Global volume loss.   Large right frontal subcutaneous hematoma with soft tissue injury.   No acute skull fracture.   Minimal ethmoid sinus mucosal thickening   Vascular calcification     CT CERVICAL SPINE:   PREVERTEBRAL SOFT TISSUES: Within normal limits.   CRANIOCERVICAL JUNCTION: Intact.   ALIGNMENT:  No traumatic malalignment or traumatic facet widening.   VERTEBRAE:  No acute fracture. Vertebral body heights are maintained.   Mild multilevel degenerative disc disease is seen. No evidence of acute cervical spine fracture.   CT face: Mildly comminuted distal nasal bone fracture. Soft tissue swelling. No orbital fracture.   No zygoma fracture.   Emphysema seen at the lung apices.       1. Global volume loss and chronic small vessel ischemic change. There is a large right sub cutaneous hematoma. There is also trace subdural and trace subarachnoid hemorrhage. No mass effect or midline shift.     No evidence of acute cervical spine fracture. Mild degenerative changes   Mildly-comminuted distal nasal bone fracture with soft tissue swelling.       Darren Kramer discussed the significance and urgency of this critical finding by epic chat with  clinical team on 2/6/2025 at 12:04 am. (**-RCF-**) Findings:  See findings.     Signed by: Darren Kramer 2/6/2025 12:06 AM Dictation workstation:   [id number]    CT 3D reconstruction    Result Date: 2/6/2025  Interpreted By:  Darren Kramer, STUDY: CT HEAD W/O CONTRAST TRAUMA PROTOCOL; CT CERVICAL SPINE WO IV CONTRAST; CT FACIAL BONES WO IV CONTRAST; CT 3D RECONSTRUCTION; 2/5/2025 11:23 pm   INDICATION:  Signs/Symptoms:Fall, head injury; Signs/Symptoms:Fall, injury, dementia; Signs/Symptoms:Fall, injury; Signs/Symptoms:trauma   COMPARISON: 04/07/2024   ACCESSION NUMBER(S): [id number]; [id number]; [id number]; [id number]   ORDERING CLINICIAN: PREET TEJADA   TECHNIQUE: Axial noncontrast CT images of head with coronal and sagittal reconstructed images. Axial noncontrast CT images of the cervical spine with coronal and sagittal reconstructed images. Axial CT imaging of the facial bones performed. Three-dimensional reconstructions performed on a separate workstation.   FINDINGS: CT HEAD:   There is a small 3 mm subdural hematoma along the falx on image 24. There is also a trace amount of subarachnoid hemorrhage along the falx along the inferior frontal region on image 16. Trace subarachnoid also noted along the bilateral sylvian fissures.       No mass effect. No midline shift.   Right parietal encephalomalacia.   Global volume loss.   Large right frontal subcutaneous hematoma with soft tissue injury.   No acute skull fracture.   Minimal ethmoid sinus mucosal thickening   Vascular calcification     CT CERVICAL SPINE:   PREVERTEBRAL SOFT TISSUES: Within normal limits.   CRANIOCERVICAL JUNCTION: Intact.   ALIGNMENT:  No traumatic malalignment or traumatic facet widening.   VERTEBRAE:  No acute fracture. Vertebral body heights are maintained.   Mild multilevel degenerative disc disease is seen. No evidence of acute cervical spine fracture.   CT face: Mildly comminuted distal nasal bone fracture. Soft tissue swelling. No orbital fracture.   No zygoma fracture.   Emphysema seen at the lung apices.       1. Global volume loss and chronic small vessel ischemic change. There is a large right sub cutaneous hematoma. There is also trace subdural and trace subarachnoid hemorrhage. No mass effect or midline shift.     No evidence of acute cervical spine fracture. Mild degenerative changes   Mildly-comminuted distal nasal  "bone fracture with soft tissue swelling.       Darren Kramer discussed the significance and urgency of this critical finding by epic chat with  clinical team on 2/6/2025 at 12:04 am. (**-RCF-**) Findings:  See findings.     Signed by: Darren Kramer 2/6/2025 12:06 AM Dictation workstation:   [id number]  .      Assessment/Plan   Assessment & Plan  Head injury, initial encounter    Right sided subdural and subarachnoid hemorrhage s/p unwitnessed fall at nursing facility.     Recommend:    Keppra 500mg BID for 7 days  Seizure precautions; fall precautions  Neuro checks every 4 hours  PT/OT    Case/plan discussed and pt seen with Dr Hughes.      Alejandrina Connelly, APRN-CNP      I saw and evaluated the patient.  I obtained the key portions of the history and examination.  I reviewed the residents/APNs note, discussed the patient and supervised treatment plan formulation.    Briefly, this is a 74 year old male with advanced dementia presented to [facility] with a fall. History largely taken from chart review. He apparently had an unwitnessed fall on 2/5.  He presented to [facility].  Head CT revealed a subdural along the falx cerebri.  Repeat imaging showed a new small area of left sided subdural hematoma.  He was seen by neurosurgery - no role for surgery.    Objective:  BP 95/57   Pulse 98   Temp 36.4 °C (97.5 °F) (Temporal)   Resp 22   Ht 1.803 m (5' 11\")   Wt 69.1 kg (152 lb 5.4 oz)   SpO2 96%   BMI 21.25 kg/m²     Gen: NAD  Neuro:  --HIF: A&O X 1, repetition and naming intact  --CN:  PERRLA, EOMI, no blink to threat on the left, no visible facial asymmetry, facial sensation intact, no tongue deviatoin  --Motor: Left arm does not appear to move as briskly as the right; both legs appear to move symmetrically  --Sensory: Intact to light touch  --Reflex: 2+ symmetric, toes down  --Cerebellum: unable to follow commands  --Gait: Deferred     CT Head 2/6/2025 (I personally reviewed the images/tracings with the following " interpretation)  Left hemispheric isodense subdural fluid collection  Stable subdural along the falx cerebri    Assessment:   Traumatic Subdural Hematoma  - reviewed NSGY note - no role for surgery  - recommend Keppra 500 mg BID for 7 days to prevent early post traumatic seizure  - follow up with NSGY    Doni Hughes MD  [facility]  Department of Neurology

## 2025-02-06 NOTE — HOSPITAL COURSE
Bret Harmon is a 74-year-old male who presented to [facility] ED late in the evening on 2/5 after an unwitnessed fall at his facility earlier.  EMS states that they were called for an unwitnessed fall, and he did have an obvious head injury upon their arrival.  Unsure if there is a loss of consciousness.  Upon arrival to the ED, he did have an obvious laceration of his right forehead with signs of head trauma as well as profuse bleeding from his right nostril and significant periorbital swelling.  His labs were largely within normal limits, however imaging of his head and neck demonstrated a trace subdural and subarachnoid hemorrhage without mass effect or midline shift.  No cervical spine fractures were identified.  He had a mildly comminuted distal nasal bone fracture as well with associated soft tissue swelling.  The patient's forehead laceration was closed with 15 simple interrupted sutures, and his right nostril bleeding was treated with Merocel nasal pack after being soaked in TXA.  Case discussed with neurosurgery, who recommended stability scan in 6 hours.  Patient was admitted to the ICU for close neurological monitoring.    Due to persistent epistaxis, ENT was consulted who recommended a cottonball soaked with Afrin solution along the right septum to further control bleeding for 24 hours.  Okay to use Afrin nasal spray (2 puffs) to saturate the cotton ball as needed.  Patient to follow-up with ENT on 2/12 for reevaluation of his nasal bone fracture.    The 6-hour stability scan of his head showed no significant expansion of his subdural hematoma or subarachnoid hemorrhage.  Neurosurgery evaluated the patient, and ultimately did not recommend any further intervention at this time.  He was free to discharge from their perspective, instructed to get a repeat CT scan in 2 weeks.  They will call with results.    The patient was deemed medically stable for discharge on 2/6/2025.  He is instructed to follow-up with his  PCP within 7 days of discharge for hospital follow-up.  He is instructed to follow-up with ENT, Dr. Amado, on 2/12 at 2:45 PM to further discuss the nasal bone fracture.  Finally, he was instructed to get a CT scan of his head in 2 weeks time to further assess the stability of the brain bleed.  Neurosurgery will call with those results.  The patient is to hold aspirin, Plavix, and cilostazol for 2 weeks upon discharge due to risk of bleeding. In addition, he was prescribed Keppra 500mg BID x 7 days.  He is not to blow his nose for the next 5 days, okay to gently wipe the nose.  He can use ice on his nasal region to help with swelling.

## 2025-02-06 NOTE — CARE PLAN
Problem: Pain - Adult  Goal: Verbalizes/displays adequate comfort level or baseline comfort level  Outcome: Progressing     Problem: Safety - Adult  Goal: Free from fall injury  Outcome: Progressing     Problem: Discharge Planning  Goal: Discharge to home or other facility with appropriate resources  Outcome: Progressing     Problem: Chronic Conditions and Co-morbidities  Goal: Patient's chronic conditions and co-morbidity symptoms are monitored and maintained or improved  Outcome: Progressing     Problem: Nutrition  Goal: Nutrient intake appropriate for maintaining nutritional needs  Outcome: Progressing     Problem: Fall/Injury  Goal: Not fall by end of shift  Outcome: Progressing  Goal: Be free from injury by end of the shift  Outcome: Progressing  Goal: Verbalize understanding of personal risk factors for fall in the hospital  Outcome: Progressing  Goal: Verbalize understanding of risk factor reduction measures to prevent injury from fall in the home  Outcome: Progressing  Goal: Use assistive devices by end of the shift  Outcome: Progressing  Goal: Pace activities to prevent fatigue by end of the shift  Outcome: Progressing  Pt is hemodynamically stable.  Neurology saw pt stated evdence of possible stroke in the past to explain left sided weakness.  Pt is total feed and was able to eat meals.  Pt was taken off aspirin and plavix, and keppra was started.  Pt broke his nose and has stitches to his head.  We continue to monitor and treat

## 2025-02-06 NOTE — DISCHARGE SUMMARY
Discharge Diagnosis  Head injury, initial encounter    Issues Requiring Follow-Up  Subdural hematoma/subarachnoid hemorrhage - repeat CT head in 2 weeks, order placed for this to be completed on 2/20.  Neurosurgery will call with updates.  Please do not take your aspirin, Plavix, or cilostazol until after the CT head has been completed.    Comminuted nasal bone fracture, epistaxis - follow up with ENT on 2/12 at 2:45, keep nasal packing for 24 hours (can saturate as needed with Afrin), any additional bleeding can be managed with cotton ball soaked with Afrin for up to 4 days    Test Results Pending At Discharge  Pending Labs       No current pending labs.            Hospital Course  Bret Harmon is a 74-year-old male who presented to [facility] ED late in the evening on 2/5 after an unwitnessed fall at his facility earlier.  EMS states that they were called for an unwitnessed fall, and he did have an obvious head injury upon their arrival.  Unsure if there is a loss of consciousness.  Upon arrival to the ED, he did have an obvious laceration of his right forehead with signs of head trauma as well as profuse bleeding from his right nostril and significant periorbital swelling.  His labs were largely within normal limits, however imaging of his head and neck demonstrated a trace subdural and subarachnoid hemorrhage without mass effect or midline shift.  No cervical spine fractures were identified.  He had a mildly comminuted distal nasal bone fracture as well with associated soft tissue swelling.  The patient's forehead laceration was closed with 15 simple interrupted sutures, and his right nostril bleeding was treated with Merocel nasal pack after being soaked in TXA.  Case discussed with neurosurgery, who recommended stability scan in 6 hours.  Patient was admitted to the ICU for close neurological monitoring.    Due to persistent epistaxis, ENT was consulted who recommended a cottonball soaked with Afrin solution along the  right septum to further control bleeding for 24 hours.  Okay to use Afrin nasal spray (2 puffs) to saturate the cotton ball as needed.  Patient to follow-up with ENT on 2/12 for reevaluation of his nasal bone fracture.    The 6-hour stability scan of his head showed no significant expansion of his subdural hematoma or subarachnoid hemorrhage.  Neurosurgery evaluated the patient, and ultimately did not recommend any further intervention at this time.  He was free to discharge from their perspective, instructed to get a repeat CT scan in 2 weeks.  They will call with results.    The patient was deemed medically stable for discharge on 2/6/2025.  He is instructed to follow-up with his PCP within 7 days of discharge for hospital follow-up.  He is instructed to follow-up with ENT, Dr. Amado, on 2/12 at 2:45 PM to further discuss the nasal bone fracture.  Finally, he was instructed to get a CT scan of his head in 2 weeks time to further assess the stability of the brain bleed.  Neurosurgery will call with those results.  The patient is to hold aspirin, Plavix, and cilostazol for 2 weeks upon discharge due to risk of bleeding. In addition, he was prescribed Keppra 500mg BID x 7 days.  He is not to blow his nose for the next 5 days, okay to gently wipe the nose.  He can use ice on his nasal region to help with swelling.    Pertinent Physical Exam At Time of Discharge  Physical Exam  General: Not in acute distress, A&O x1 (baseline), alert, coopertive, well-developed; speech is somewhat slurred but understandable  HEENT: Swelling noted about the right eye and right side of face, head and forehead wrapped with bandage (not removed); EOMI on left eye (right eye exam limited secondary to swelling), moist mucous membranes  Neck: Neck supple, trachea midline, no evidence of trauma  Cardiovascular: RRR, S1 and S2 appreciated, no murmurs rubs gallops appreciated  Respiratory: Clear to auscultation bilaterally without wheezes, rales,  rhonchi; no increased work of breathing noted  GI: Abdomen soft, nondistended, nontender to palpation, bowel sounds present  Extremities: No edema appreciated in lower extremities bilaterally, no cyanosis  Neuro: A&O x3, no focal deficits, some diminished strength in the left lower extremity and left upper extremity  Skin: Warm and dry, without lesions or rashes      Home Medications     Medication List      START taking these medications     levETIRAcetam 500 mg tablet; Commonly known as: Keppra; Take 1 tablet   (500 mg) by mouth 2 times a day for 14 doses.   oxymetazoline 0.05 % nasal spray; Commonly known as: Afrin; Administer 2   sprays into each nostril every 12 hours if needed for congestion for up to   4 days. Do not use for more than 3 days.     CONTINUE taking these medications     atorvastatin 40 mg tablet; Commonly known as: Lipitor   bisacodyl 10 mg suppository; Commonly known as: Dulcolax   carvedilol 6.25 mg tablet; Commonly known as: Coreg   cyanocobalamin 250 mcg tablet; Commonly known as: Vitamin B-12   isosorbide mononitrate ER 30 mg 24 hr tablet; Commonly known as: Imdur   magnesium hydroxide 400 mg/5 mL suspension; Commonly known as: Milk of   Magnesia   melatonin 3 mg tablet   mirtazapine 7.5 mg tablet; Commonly known as: Remeron   nitroglycerin 0.4 mg SL tablet; Commonly known as: Nitrostat   pantoprazole 20 mg EC tablet; Commonly known as: ProtoNix   rivastigmine 3 mg capsule; Commonly known as: Exelon   Spiriva Respimat 2.5 mcg/actuation inhaler; Generic drug: tiotropium     STOP taking these medications     aspirin 81 mg chewable tablet   cilostazol 100 mg tablet; Commonly known as: Pletal   clopidogrel 75 mg tablet; Commonly known as: Plavix   lisinopril 2.5 mg tablet       Outpatient Follow-Up  Future Appointments   Date Time Provider Department Center   2/12/2025  2:45 PM DO [id number] Mack Moreno DO  Internal Medicine PGY-1 Resident    Attending  Addendum:    I saw and evaluated the patient. I personally obtained the key and critical portions of the history and physical exam or was physically present for key and critical portions performed by the resident/fellow.

## 2025-02-06 NOTE — PROGRESS NOTES
02/06/25 1149   Discharge Planning   Living Arrangements Alone   Support Systems Spouse/significant other;Family members   Assistance Needed yes   Type of Residence Nursing home/residential care   Home or Post Acute Services Post acute facilities (Rehab/SNF/etc)   Type of Post Acute Facility Services Long term care   Expected Discharge Disposition Inter   Does the patient need discharge transport arranged? Yes   RoundTrip coordination needed? Yes   Has discharge transport been arranged? No   Patient Choice   Patient / Family choosing to utilize agency / facility established prior to hospitalization Yes   Intensity of Service   Intensity of Service >30 min     Patient baseline confused. Spoke with patient's POA/sister, Keesha, to explain my role in discharge planning. She stated patient lives at [facility] and plan is to return there. She also requested someone from the medical team call her with test results, stating she did not know he was in the hospital. Medical team updated that Keesha would like a call. Message sent to [facility] to request to send a return to LTC referral to [facility].

## 2025-02-06 NOTE — CONSULTS
Reason For Consult  Persistent epistaxis with recent history of nasal bone fracture status post unwitnessed fall    History Of Present Illness  Bret Harmon is a 74 y.o. male presenting through the emergency department after he sustained an unwitnessed fall.  He has a closed nasal bone fracture and persistent epistaxis from his right nostril.  He has been packed with a 10 cm nasal Merocel sponge and he continues to have bleeding out of the front of his nose.  The patient is currently on Plavix.  He has a small subdural hematoma as well.  He is currently in the ICU and ENT was consulted due to the persistent bleeding.  There is no history of any bleeding disorders and no other intranasal trauma noted.  A CT scan did reveal a closed nasal bone fracture but no evidence of any facial fractures.     Past Medical History  He has a past medical history of COPD (chronic obstructive pulmonary disease) (Multi), Coronary artery disease, Dementia, Depression, Dysphagia, Hyperlipidemia, Hypertension, Insomnia, Muscle weakness, Peripheral vascular disease (CMS-[facility]), Prediabetes, and Protein calorie malnutrition (Multi).    Surgical History  He has no past surgical history on file.     Social History  He reports that he has quit smoking. His smoking use included cigarettes. He does not have any smokeless tobacco history on file. He reports that he does not currently use alcohol. He reports that he does not use drugs.    Family History  Family History   Family history unknown: Yes        Allergies  Bee pollen    Review of Systems  A detailed 12 system review of systems is noted on the intake form has been reviewed with the patient with details noted in the HPI and scanned into the patient's medical record     Physical Exam  General: Patient is alert, oriented, cooperative in no apparent distress.  Head: Bandage in place where he sustained a facial laceration and this was repaired in the emergency department  Eyes: Periorbital edema  in the right eye is closed.  Pupils are equal round and reactive to light.  Extraocular muscles are intact.  Ears: Right Ear-- Pinna is normal.  External auditory canal is patent, no lesions. Tympanic membrane is [intact, translucent and has good mobility with my pneumatic otoscope. No effusion].  Mastoid is nontender.            Left ear-- Pinna is normal.  External auditory canal is patent, no lesions.  Tympanic membrane is [intact, translucent and has good mobility with my pneumatic otoscope.  No effusion].  Mastoid is nontender.  External nose: Significant edema with slight abrasion along the nasal dorsum.  No active bleeding.  Nose: A 10 cm nasal Merocel sponge is in place however he has persistent bleeding out of the front of his nose on the right side.  Clots were removed from the left side but no obvious source of bleeding on the left.  Septum remains midline.  No obvious septal hematoma or seroma.  After removing the Merisel sponge, the patient has a minor laceration along the floor of his nose up against the septum.  I was able to control bleeding using direct pressure with a cottonball soaked in Afrin solution and then I used silver nitrate to stop the bleeding.  We will leave the cottonball soaked in Afrin sitting up against the small laceration for now.  Throat:  Floor of mouth is clear, no masses.  Tongue appears normal, no lesions or masses. Gums, gingiva, buccal mucosa appear pink and moist, no lesions. Teeth are in fair repair.  No obvious dental infections.  Peritonsillar regions appear symmetric without swelling.  Hard and soft palate appear normal, no obvious cleft. Uvula is midline.  Oropharynx: No lesions. Retropharyngeal wall is flat.  No active postnasal drip.  No evidence of any posterior bleeding down the oropharynx  Neck: Supple,  no lymphadenopathy.  No masses.  Salivary Glands: Symmetric bilaterally.  No palpable masses.  No evidence of acute infection or salivary stones  Neurologic:  "Cranial Nerves 2-12 are grossly intact without focal deficits. Cerebellar function testing is normal.     Last Recorded Vitals  Blood pressure 165/82, pulse (!) 122, temperature 36.6 °C (97.9 °F), resp. rate 17, height 1.803 m (5' 11\"), weight 69.1 kg (152 lb 5.4 oz), SpO2 96%.    Relevant Results      XR chest 1 view    Result Date: 2/6/2025  Interpreted By:  Darren Kramer, STUDY: XR CHEST 1 VIEW;  2/5/2025 11:38 pm   INDICATION: Signs/Symptoms:Unwitnessed fall.     COMPARISON: 03/12/2024   ACCESSION NUMBER(S): [id number]   ORDERING CLINICIAN: PREET TEJADA   FINDINGS:         CARDIOMEDIASTINAL SILHOUETTE: Cardiomediastinal silhouette is normal in size and configuration.   LUNGS: Hyperinflation. No localizing infiltrate effusion or pneumothorax.   ABDOMEN: No remarkable upper abdominal findings.   BONES: No acute osseous changes.       1.  Hyperinflation. No focal infiltrate.     MACRO: None   Signed by: Darren Kramer 2/6/2025 12:09 AM Dictation workstation:   [id number]    CT head W O contrast trauma protocol    Result Date: 2/6/2025  Interpreted By:  Darren Kramer, STUDY: CT HEAD W/O CONTRAST TRAUMA PROTOCOL; CT CERVICAL SPINE WO IV CONTRAST; CT FACIAL BONES WO IV CONTRAST; CT 3D RECONSTRUCTION; 2/5/2025 11:23 pm   INDICATION: Signs/Symptoms:Fall, head injury; Signs/Symptoms:Fall, injury, dementia; Signs/Symptoms:Fall, injury; Signs/Symptoms:trauma   COMPARISON: 04/07/2024   ACCESSION NUMBER(S): [id number]; [id number]; [id number]; [id number]   ORDERING CLINICIAN: PREET TEJADA   TECHNIQUE: Axial noncontrast CT images of head with coronal and sagittal reconstructed images. Axial noncontrast CT images of the cervical spine with coronal and sagittal reconstructed images. Axial CT imaging of the facial bones performed. Three-dimensional reconstructions performed on a separate workstation.   FINDINGS: CT HEAD:   There is a small 3 mm subdural hematoma along the falx on image 24. There is also a " trace amount of subarachnoid hemorrhage along the falx along the inferior frontal region on image 16. Trace subarachnoid also noted along the bilateral sylvian fissures.       No mass effect. No midline shift.   Right parietal encephalomalacia.   Global volume loss.   Large right frontal subcutaneous hematoma with soft tissue injury.   No acute skull fracture.   Minimal ethmoid sinus mucosal thickening   Vascular calcification     CT CERVICAL SPINE:   PREVERTEBRAL SOFT TISSUES: Within normal limits.   CRANIOCERVICAL JUNCTION: Intact.   ALIGNMENT:  No traumatic malalignment or traumatic facet widening.   VERTEBRAE:  No acute fracture. Vertebral body heights are maintained.   Mild multilevel degenerative disc disease is seen. No evidence of acute cervical spine fracture.   CT face: Mildly comminuted distal nasal bone fracture. Soft tissue swelling. No orbital fracture.   No zygoma fracture.   Emphysema seen at the lung apices.       1. Global volume loss and chronic small vessel ischemic change. There is a large right sub cutaneous hematoma. There is also trace subdural and trace subarachnoid hemorrhage. No mass effect or midline shift.     No evidence of acute cervical spine fracture. Mild degenerative changes   Mildly-comminuted distal nasal bone fracture with soft tissue swelling.       Darren Kramer discussed the significance and urgency of this critical finding by epic chat with  clinical team on 2/6/2025 at 12:04 am. (**-RCF-**) Findings:  See findings.     Signed by: Darren Kramer 2/6/2025 12:06 AM Dictation workstation:   [id number]    CT maxillofacial bones wo IV contrast    Result Date: 2/6/2025  Interpreted By:  Darren Kramer, STUDY: CT HEAD W/O CONTRAST TRAUMA PROTOCOL; CT CERVICAL SPINE WO IV CONTRAST; CT FACIAL BONES WO IV CONTRAST; CT 3D RECONSTRUCTION; 2/5/2025 11:23 pm   INDICATION: Signs/Symptoms:Fall, head injury; Signs/Symptoms:Fall, injury, dementia; Signs/Symptoms:Fall, injury;  Signs/Symptoms:trauma   COMPARISON: 04/07/2024   ACCESSION NUMBER(S): [id number]; [id number]; [id number]; [id number]   ORDERING CLINICIAN: PREET TEJADA   TECHNIQUE: Axial noncontrast CT images of head with coronal and sagittal reconstructed images. Axial noncontrast CT images of the cervical spine with coronal and sagittal reconstructed images. Axial CT imaging of the facial bones performed. Three-dimensional reconstructions performed on a separate workstation.   FINDINGS: CT HEAD:   There is a small 3 mm subdural hematoma along the falx on image 24. There is also a trace amount of subarachnoid hemorrhage along the falx along the inferior frontal region on image 16. Trace subarachnoid also noted along the bilateral sylvian fissures.       No mass effect. No midline shift.   Right parietal encephalomalacia.   Global volume loss.   Large right frontal subcutaneous hematoma with soft tissue injury.   No acute skull fracture.   Minimal ethmoid sinus mucosal thickening   Vascular calcification     CT CERVICAL SPINE:   PREVERTEBRAL SOFT TISSUES: Within normal limits.   CRANIOCERVICAL JUNCTION: Intact.   ALIGNMENT:  No traumatic malalignment or traumatic facet widening.   VERTEBRAE:  No acute fracture. Vertebral body heights are maintained.   Mild multilevel degenerative disc disease is seen. No evidence of acute cervical spine fracture.   CT face: Mildly comminuted distal nasal bone fracture. Soft tissue swelling. No orbital fracture.   No zygoma fracture.   Emphysema seen at the lung apices.       Procedure: Complex control of anterior epistaxis  Pre OP Diagnosis: Persistent right anterior epistaxis  Post OP Diagnosis: Same with evidence of small laceration along the nasal floor/right anterior nasal septum  Procedure: Complex control of anterior epistaxis  Surgeon: Aneudy DELATORRE  Assist: None  Anesthesia: None required  EBL: Minimal  Complications: None  Disposition: Patient tolerated the procedure well and remained  in the ICU in guarded condition.  Procedure: After informed consent was obtained, the patient was sat up in the ICU bed and a 10 cm nasal Merocel sponge was carefully removed from the right nostril.  Blood was suctioned from both nostrils using a Viveros tip suction.  I then evaluated the left nostril and there is no evidence of any active bleeding along the septum or lateral nasal wall.  I then evaluated the right anterior septum and the patient had a small laceration with active bleeding.  Using direct pressure with a cottonball soaked in Afrin solution, I was able to slow the bleeding down.  I then cauterized the area using silver nitrate cautery.  I then reapplied the Afrin soaked cotton ball up against the septum and held direct pressure for 10 minutes.  There was no further active bleeding.  The patient tolerated the procedure well and there were no complications.     Assessment/Plan   Status post unwitnessed fall with resultant small subdural hematoma with small subarachnoid hemorrhage-neurosurgery following  Acute anterior epistaxis with small laceration right floor of nose next to the septum-controlled with direct pressure with Afrin and cauterization with silver nitrate  Mild closed nasal bone fracture    Recommendations:  Maintain cottonball soaked with Afrin solution along the right septum as direct pressure to further control bleeding for 24hrs.   Okay to use Afrin nasal spray-2 puffs to saturate the cottonball as needed.  Nasal drip pad as needed  Will reevaluate closed nasal bone fracture in 5 days once the swelling goes down.  Will then determine if any surgical intervention will be needed.  Continue to monitor blood pressure closely  Okay to apply ice to the nasal region to help with any swelling.  If patient is discharged over the next several days, I will see the patient in my office on February 11 or 12 to reevaluate his nasal bone fracture.        Please call [phone] to make a follow-up  appointment  6.   No nose blowing for the next 5 days.  Okay to gently wipe the nose.    I will continue to follow along.    I spent 60 minutes in the professional and overall care of this patient.    Thanks for allow me to assist in the care of your patient.  Regards,  Chente Amado, DO

## 2025-02-06 NOTE — H&P
Critical Care Medicine History and Physical        Subjective   Patient is a 74 y.o. male admitted on 2/5/2025 10:52 PM admitted to the ICU for subdural and subarachnoid hemorrhage after unwitnessed fall.  For close neurological assessment       HPI     Bret Harmon is a 74 y.o. year old male patient with PMHx of CAD, PAD, COPD, dementia (A&O x 1 at baseline), hypertension, dyslipidemia, prediabetes, depression sent to ER by EMS from [facility] after having an unwitnessed fall. He is a poor historian and has no recollection of the incident. The history is obtained from the ED physician and the EMS report.    On evaluation, the patient is currently alert and oriented to person and place. He is able to state his name and knows that he is in the hospital. Per the ED report, the patient sustained an obvious laceration to the right forehead with signs of head trauma. He was noted to be bleeding profusely from his right nostril and had significant right periorbital swelling.    In the ED, initial vitals showed the patient was afebrile with a heart rate of 106 bpm, respiratory rate of 24, blood pressure of 188/94 mmHg, and SpO2 of 95% on room air. An initial EKG revealed sinus tachycardia with a left axis deviation, a ventricular rate of 107 bpm, and a QTc of 501.     Laboratory workup showed a leukocytosis of 12.4, hemoglobin of 12.2, hematocrit of 38.2, and platelets of 310, which were within normal baseline values. CMP showed hyperglycemia at 118 mg/dL and a potassium level of 6.1 (noted to be from a hemolyzed sample). Chloride was 108, and troponins were negative. A chest X-ray was unremarkable.  Repeated potassium was normal.     Imaging included a CT head and C-spine, which demonstrated global volume loss with chronic small vessel ischemia, a large right subcutaneous hematoma, and trace subdural and subarachnoid hemorrhage without mass effect or midline shift. No cervical spine fractures were identified.  Additionally, there was a mildly comminuted distal nasal bone fracture with associated soft tissue swelling.    Interventions in the ED included closure of the right forehead laceration with 15 simple interrupted sutures. For the right nostril bleeding, an 8 cm Merocel nasal pack (trimmed to 6 cm) was applied to the right naris, soaked with 10 cc of TXA.     The case was discussed with neurosurgery (Dr. Sarah), who recommended a stability scan in 6 hours. Trauma surgery was consulted and recommended ENT evaluation for the nasal bone fracture as well as hospital admission for close monitoring and follow-up.    Past Medical History:   Diagnosis Date    COPD (chronic obstructive pulmonary disease) (Multi)     Coronary artery disease     Dementia     Depression     Dysphagia     Hyperlipidemia     Hypertension     Insomnia     Muscle weakness     Peripheral vascular disease (CMS-HCC)     Prediabetes     Protein calorie malnutrition (Multi)      History reviewed. No pertinent surgical history.  Medications Prior to Admission   Medication Sig Dispense Refill Last Dose/Taking    aspirin 81 mg chewable tablet Chew 1 tablet (81 mg) once daily.       atorvastatin (Lipitor) 40 mg tablet Take 1 tablet (40 mg) by mouth once daily.       carvedilol (Coreg) 25 mg tablet Take 1 tablet (25 mg) by mouth 2 times a day with meals.       cilostazol (Pletal) 100 mg tablet Take 1 tablet (100 mg) by mouth 2 times a day.       clopidogrel (Plavix) 75 mg tablet Take 1 tablet (75 mg) by mouth once daily.       cyanocobalamin (Vitamin B-12) 250 mcg tablet Take 1 tablet (250 mcg) by mouth once daily.       FLUoxetine (PROzac) 10 mg capsule Take 1 capsule (10 mg) by mouth once daily.       isosorbide mononitrate ER (Imdur) 30 mg 24 hr tablet Take 1 tablet (30 mg) by mouth once daily. Do not crush or chew.       lisinopril 2.5 mg tablet Take 1 tablet (2.5 mg) by mouth once daily.       melatonin 3 mg tablet Take 1 tablet (3 mg) by mouth once daily  at bedtime.       nitroglycerin (Nitrostat) 0.4 mg SL tablet Place 1 tablet (0.4 mg) under the tongue every 5 minutes if needed for chest pain.       pantoprazole (ProtoNix) 20 mg EC tablet Take 1 tablet (20 mg) by mouth once daily in the morning. Take before meals. Do not crush, chew, or split.       rivastigmine (Exelon) 1.5 mg capsule Take 1 capsule (1.5 mg) by mouth 2 times a day.       tiotropium (Spiriva Respimat) 2.5 mcg/actuation inhaler Inhale 2 puffs once daily.        Bee pollen  Social History     Tobacco Use    Smoking status: Former     Types: Cigarettes   Substance Use Topics    Alcohol use: Not Currently    Drug use: Never     Family History   Family history unknown: Yes       Review of Systems:  Review of Systems   Unable to perform ROS: Dementia        Objective     PHYSICAL EXAM     Physical Exam  Constitutional:       Appearance: He is normal weight.   HENT:      Head:      Comments: Head laceration was closed with 15 interrupted sutures.     Ears:      Comments: significant right periorbital swelling.       Nose:      Comments: Mild bleeding from right nostril with nasal packing.  Upon reevaluation, right inferior medial laceration of the nasal septum     Mouth/Throat:      Comments: Dry blood was noted on the tongue  Eyes:      Pupils: Pupils are equal, round, and reactive to light.      Comments: Pupil responsive and reactive   Cardiovascular:      Rate and Rhythm: Regular rhythm. Tachycardia present.   Pulmonary:      Effort: Pulmonary effort is normal. No respiratory distress.      Breath sounds: No wheezing.   Abdominal:      General: Abdomen is flat. Bowel sounds are normal.      Palpations: Abdomen is soft.   Musculoskeletal:         General: Normal range of motion.      Cervical back: Normal range of motion and neck supple.      Right lower leg: No edema.      Left lower leg: No edema.   Skin:     General: Skin is warm.      Capillary Refill: Capillary refill takes less than 2 seconds.  "     Findings: No lesion.      Comments: Per the ED exam, he has a large, 5 cm stellate laceration on the right forehead with surrounding edema. There is noticeable swelling around the right eye. A laceration is present on the bridge of the nose. Dried blood is observed in both nares, with some ongoing active bleeding.  \"Right forehead wound was closed\"   Neurological:      General: No focal deficit present.      Mental Status: He is alert. Mental status is at baseline.      Comments: Slurred speech, intact power in upper and lower extremity.   Psychiatric:         Mood and Affect: Mood normal.        Vitals:  Most Recent:  Vitals:    02/06/25 0530   BP: 155/78   Pulse: (!) 120   Resp: 23   Temp:    SpO2: 96%       Scheduled Medications:   insulin lispro, 0-5 Units, subcutaneous, q4h  magnesium sulfate, 2 g, intravenous, Once  melatonin, 3 mg, oral, Nightly  mupirocin, , Topical, TID  potassium chloride, 20 mEq, intravenous, q2h  silver nitrate applicators, , Topical, Once         Continuous Medications:         PRN Medications:   PRN medications: dextrose, dextrose, glucagon, glucagon, hydrALAZINE, labetaloL, oxymetazoline, polyethylene glycol    24hr Min/Max:  Temp  Min: 36.6 °C (97.9 °F)  Max: 36.8 °C (98.2 °F)  Pulse  Min: 15  Max: 122  BP  Min: 102/62  Max: 200/99  Resp  Min: 12  Max: 28  SpO2  Min: 94 %  Max: 98 %    LDA:          Vent settings:       Hemodynamic parameters for last 24 hours:         Intake/Output Summary (Last 24 hours) at 2/6/2025 0554  Last data filed at 2/6/2025 0115  Gross per 24 hour   Intake --   Output 200 ml   Net -200 ml       Lab/Radiology/Diagnostic Review:  Results for orders placed or performed during the hospital encounter of 02/05/25 (from the past 24 hours)   CBC and Auto Differential   Result Value Ref Range    WBC 12.4 (H) 4.4 - 11.3 x10*3/uL    nRBC 0.0 0.0 - 0.0 /100 WBCs    RBC 4.26 (L) 4.50 - 5.90 x10*6/uL    Hemoglobin 12.2 (L) 13.5 - 17.5 g/dL    Hematocrit 38.2 (L) " 41.0 - 52.0 %    MCV 90 80 - 100 fL    MCH 28.6 26.0 - 34.0 pg    MCHC 31.9 (L) 32.0 - 36.0 g/dL    RDW 15.2 (H) 11.5 - 14.5 %    Platelets 310 150 - 450 x10*3/uL    Neutrophils % 66.8 40.0 - 80.0 %    Immature Granulocytes %, Automated 0.3 0.0 - 0.9 %    Lymphocytes % 22.8 13.0 - 44.0 %    Monocytes % 8.3 2.0 - 10.0 %    Eosinophils % 1.6 0.0 - 6.0 %    Basophils % 0.2 0.0 - 2.0 %    Neutrophils Absolute 8.30 (H) 1.60 - 5.50 x10*3/uL    Immature Granulocytes Absolute, Automated 0.04 0.00 - 0.50 x10*3/uL    Lymphocytes Absolute 2.83 0.80 - 3.00 x10*3/uL    Monocytes Absolute 1.03 (H) 0.05 - 0.80 x10*3/uL    Eosinophils Absolute 0.20 0.00 - 0.40 x10*3/uL    Basophils Absolute 0.03 0.00 - 0.10 x10*3/uL   Comprehensive Metabolic Panel   Result Value Ref Range    Glucose 118 (H) 74 - 99 mg/dL    Sodium 139 136 - 145 mmol/L    Potassium 6.1 (HH) 3.5 - 5.3 mmol/L    Chloride 108 (H) 98 - 107 mmol/L    Bicarbonate 25 21 - 32 mmol/L    Anion Gap 12 10 - 20 mmol/L    Urea Nitrogen 16 6 - 23 mg/dL    Creatinine 1.09 0.50 - 1.30 mg/dL    eGFR 70 >60 mL/min/1.73m*2    Calcium 9.4 8.6 - 10.3 mg/dL    Albumin 3.9 3.4 - 5.0 g/dL    Alkaline Phosphatase 83 33 - 136 U/L    Total Protein 8.0 6.4 - 8.2 g/dL    AST 30 9 - 39 U/L    Bilirubin, Total 0.4 0.0 - 1.2 mg/dL    ALT 15 10 - 52 U/L   Magnesium   Result Value Ref Range    Magnesium 1.87 1.60 - 2.40 mg/dL   Troponin I, High Sensitivity, Initial   Result Value Ref Range    Troponin I, High Sensitivity 10 0 - 20 ng/L   Protime-INR   Result Value Ref Range    Protime 13.5 (H) 9.8 - 12.8 seconds    INR 1.2 (H) 0.9 - 1.1   Type And Screen   Result Value Ref Range    ABO TYPE AB     Rh TYPE POS     ANTIBODY SCREEN NEG    SST TOP   Result Value Ref Range    Extra Tube Hold for add-ons.    Sedimentation Rate   Result Value Ref Range    Sedimentation Rate 63 (H) 0 - 20 mm/h   Troponin, High Sensitivity, 1 Hour   Result Value Ref Range    Troponin I, High Sensitivity 11 0 - 20 ng/L    Potassium   Result Value Ref Range    Potassium 3.7 3.5 - 5.3 mmol/L   C-reactive protein   Result Value Ref Range    C-Reactive Protein 1.96 (H) <1.00 mg/dL   Lactate   Result Value Ref Range    Lactate 1.6 0.4 - 2.0 mmol/L   Comprehensive metabolic panel   Result Value Ref Range    Glucose 130 (H) 74 - 99 mg/dL    Sodium 142 136 - 145 mmol/L    Potassium 3.6 3.5 - 5.3 mmol/L    Chloride 109 (H) 98 - 107 mmol/L    Bicarbonate 26 21 - 32 mmol/L    Anion Gap 11 10 - 20 mmol/L    Urea Nitrogen 16 6 - 23 mg/dL    Creatinine 1.09 0.50 - 1.30 mg/dL    eGFR 70 >60 mL/min/1.73m*2    Calcium 9.5 8.6 - 10.3 mg/dL    Albumin 3.7 3.4 - 5.0 g/dL    Alkaline Phosphatase 80 33 - 136 U/L    Total Protein 7.3 6.4 - 8.2 g/dL    AST 20 9 - 39 U/L    Bilirubin, Total 0.4 0.0 - 1.2 mg/dL    ALT 14 10 - 52 U/L   POCT GLUCOSE   Result Value Ref Range    POCT Glucose 112 (H) 74 - 99 mg/dL   CBC and Auto Differential   Result Value Ref Range    WBC 13.4 (H) 4.4 - 11.3 x10*3/uL    nRBC 0.0 0.0 - 0.0 /100 WBCs    RBC 3.90 (L) 4.50 - 5.90 x10*6/uL    Hemoglobin 10.9 (L) 13.5 - 17.5 g/dL    Hematocrit 34.4 (L) 41.0 - 52.0 %    MCV 88 80 - 100 fL    MCH 27.9 26.0 - 34.0 pg    MCHC 31.7 (L) 32.0 - 36.0 g/dL    RDW 15.2 (H) 11.5 - 14.5 %    Platelets 308 150 - 450 x10*3/uL    Neutrophils % 78.6 40.0 - 80.0 %    Immature Granulocytes %, Automated 0.4 0.0 - 0.9 %    Lymphocytes % 13.0 13.0 - 44.0 %    Monocytes % 7.0 2.0 - 10.0 %    Eosinophils % 0.6 0.0 - 6.0 %    Basophils % 0.4 0.0 - 2.0 %    Neutrophils Absolute 10.55 (H) 1.60 - 5.50 x10*3/uL    Immature Granulocytes Absolute, Automated 0.05 0.00 - 0.50 x10*3/uL    Lymphocytes Absolute 1.75 0.80 - 3.00 x10*3/uL    Monocytes Absolute 0.94 (H) 0.05 - 0.80 x10*3/uL    Eosinophils Absolute 0.08 0.00 - 0.40 x10*3/uL    Basophils Absolute 0.05 0.00 - 0.10 x10*3/uL     XR chest 1 view    Result Date: 2/6/2025  Interpreted By:  Darren Kramer, STUDY: XR CHEST 1 VIEW;  2/5/2025 11:38 pm   INDICATION:  Signs/Symptoms:Unwitnessed fall.     COMPARISON: 03/12/2024   ACCESSION NUMBER(S): [id number]   ORDERING CLINICIAN: PREET TEJADA   FINDINGS:         CARDIOMEDIASTINAL SILHOUETTE: Cardiomediastinal silhouette is normal in size and configuration.   LUNGS: Hyperinflation. No localizing infiltrate effusion or pneumothorax.   ABDOMEN: No remarkable upper abdominal findings.   BONES: No acute osseous changes.       1.  Hyperinflation. No focal infiltrate.     MACRO: None   Signed by: Darren Kramer 2/6/2025 12:09 AM Dictation workstation:   [id number]    CT head W O contrast trauma protocol    Result Date: 2/6/2025  Interpreted By:  Darren Kramer, STUDY: CT HEAD W/O CONTRAST TRAUMA PROTOCOL; CT CERVICAL SPINE WO IV CONTRAST; CT FACIAL BONES WO IV CONTRAST; CT 3D RECONSTRUCTION; 2/5/2025 11:23 pm   INDICATION: Signs/Symptoms:Fall, head injury; Signs/Symptoms:Fall, injury, dementia; Signs/Symptoms:Fall, injury; Signs/Symptoms:trauma   COMPARISON: 04/07/2024   ACCESSION NUMBER(S): [id number]; [id number]; [id number]; [id number]   ORDERING CLINICIAN: PREET TEJADA   TECHNIQUE: Axial noncontrast CT images of head with coronal and sagittal reconstructed images. Axial noncontrast CT images of the cervical spine with coronal and sagittal reconstructed images. Axial CT imaging of the facial bones performed. Three-dimensional reconstructions performed on a separate workstation.   FINDINGS: CT HEAD:   There is a small 3 mm subdural hematoma along the falx on image 24. There is also a trace amount of subarachnoid hemorrhage along the falx along the inferior frontal region on image 16. Trace subarachnoid also noted along the bilateral sylvian fissures.       No mass effect. No midline shift.   Right parietal encephalomalacia.   Global volume loss.   Large right frontal subcutaneous hematoma with soft tissue injury.   No acute skull fracture.   Minimal ethmoid sinus mucosal thickening   Vascular calcification     CT  CERVICAL SPINE:   PREVERTEBRAL SOFT TISSUES: Within normal limits.   CRANIOCERVICAL JUNCTION: Intact.   ALIGNMENT:  No traumatic malalignment or traumatic facet widening.   VERTEBRAE:  No acute fracture. Vertebral body heights are maintained.   Mild multilevel degenerative disc disease is seen. No evidence of acute cervical spine fracture.   CT face: Mildly comminuted distal nasal bone fracture. Soft tissue swelling. No orbital fracture.   No zygoma fracture.   Emphysema seen at the lung apices.       1. Global volume loss and chronic small vessel ischemic change. There is a large right sub cutaneous hematoma. There is also trace subdural and trace subarachnoid hemorrhage. No mass effect or midline shift.     No evidence of acute cervical spine fracture. Mild degenerative changes   Mildly-comminuted distal nasal bone fracture with soft tissue swelling.       Darren Kramer discussed the significance and urgency of this critical finding by epic chat with  clinical team on 2/6/2025 at 12:04 am. (**-RCF-**) Findings:  See findings.     Signed by: Darren Kramer 2/6/2025 12:06 AM Dictation workstation:   [id number]    CT maxillofacial bones wo IV contrast    Result Date: 2/6/2025  Interpreted By:  Darren Kramer, STUDY: CT HEAD W/O CONTRAST TRAUMA PROTOCOL; CT CERVICAL SPINE WO IV CONTRAST; CT FACIAL BONES WO IV CONTRAST; CT 3D RECONSTRUCTION; 2/5/2025 11:23 pm   INDICATION: Signs/Symptoms:Fall, head injury; Signs/Symptoms:Fall, injury, dementia; Signs/Symptoms:Fall, injury; Signs/Symptoms:trauma   COMPARISON: 04/07/2024   ACCESSION NUMBER(S): [id number]; [id number]; [id number]; [id number]   ORDERING CLINICIAN: PREET TEJADA   TECHNIQUE: Axial noncontrast CT images of head with coronal and sagittal reconstructed images. Axial noncontrast CT images of the cervical spine with coronal and sagittal reconstructed images. Axial CT imaging of the facial bones performed. Three-dimensional reconstructions performed  on a separate workstation.   FINDINGS: CT HEAD:   There is a small 3 mm subdural hematoma along the falx on image 24. There is also a trace amount of subarachnoid hemorrhage along the falx along the inferior frontal region on image 16. Trace subarachnoid also noted along the bilateral sylvian fissures.       No mass effect. No midline shift.   Right parietal encephalomalacia.   Global volume loss.   Large right frontal subcutaneous hematoma with soft tissue injury.   No acute skull fracture.   Minimal ethmoid sinus mucosal thickening   Vascular calcification     CT CERVICAL SPINE:   PREVERTEBRAL SOFT TISSUES: Within normal limits.   CRANIOCERVICAL JUNCTION: Intact.   ALIGNMENT:  No traumatic malalignment or traumatic facet widening.   VERTEBRAE:  No acute fracture. Vertebral body heights are maintained.   Mild multilevel degenerative disc disease is seen. No evidence of acute cervical spine fracture.   CT face: Mildly comminuted distal nasal bone fracture. Soft tissue swelling. No orbital fracture.   No zygoma fracture.   Emphysema seen at the lung apices.       1. Global volume loss and chronic small vessel ischemic change. There is a large right sub cutaneous hematoma. There is also trace subdural and trace subarachnoid hemorrhage. No mass effect or midline shift.     No evidence of acute cervical spine fracture. Mild degenerative changes   Mildly-comminuted distal nasal bone fracture with soft tissue swelling.       Darren Kramer discussed the significance and urgency of this critical finding by epic chat with  clinical team on 2/6/2025 at 12:04 am. (**-RCF-**) Findings:  See findings.     Signed by: Darren Kramer 2/6/2025 12:06 AM Dictation workstation:   [id number]    CT cervical spine wo IV contrast    Result Date: 2/6/2025  Interpreted By:  Darren Kramer, STUDY: CT HEAD W/O CONTRAST TRAUMA PROTOCOL; CT CERVICAL SPINE WO IV CONTRAST; CT FACIAL BONES WO IV CONTRAST; CT 3D RECONSTRUCTION; 2/5/2025 11:23 pm    INDICATION: Signs/Symptoms:Fall, head injury; Signs/Symptoms:Fall, injury, dementia; Signs/Symptoms:Fall, injury; Signs/Symptoms:trauma   COMPARISON: 04/07/2024   ACCESSION NUMBER(S): [id number]; [id number]; [id number]; [id number]   ORDERING CLINICIAN: PREET TEJADA   TECHNIQUE: Axial noncontrast CT images of head with coronal and sagittal reconstructed images. Axial noncontrast CT images of the cervical spine with coronal and sagittal reconstructed images. Axial CT imaging of the facial bones performed. Three-dimensional reconstructions performed on a separate workstation.   FINDINGS: CT HEAD:   There is a small 3 mm subdural hematoma along the falx on image 24. There is also a trace amount of subarachnoid hemorrhage along the falx along the inferior frontal region on image 16. Trace subarachnoid also noted along the bilateral sylvian fissures.       No mass effect. No midline shift.   Right parietal encephalomalacia.   Global volume loss.   Large right frontal subcutaneous hematoma with soft tissue injury.   No acute skull fracture.   Minimal ethmoid sinus mucosal thickening   Vascular calcification     CT CERVICAL SPINE:   PREVERTEBRAL SOFT TISSUES: Within normal limits.   CRANIOCERVICAL JUNCTION: Intact.   ALIGNMENT:  No traumatic malalignment or traumatic facet widening.   VERTEBRAE:  No acute fracture. Vertebral body heights are maintained.   Mild multilevel degenerative disc disease is seen. No evidence of acute cervical spine fracture.   CT face: Mildly comminuted distal nasal bone fracture. Soft tissue swelling. No orbital fracture.   No zygoma fracture.   Emphysema seen at the lung apices.       1. Global volume loss and chronic small vessel ischemic change. There is a large right sub cutaneous hematoma. There is also trace subdural and trace subarachnoid hemorrhage. No mass effect or midline shift.     No evidence of acute cervical spine fracture. Mild degenerative changes   Mildly-comminuted  distal nasal bone fracture with soft tissue swelling.       Darren Kramer discussed the significance and urgency of this critical finding by epic chat with  clinical team on 2/6/2025 at 12:04 am. (**-RCF-**) Findings:  See findings.     Signed by: Darren Kramer 2/6/2025 12:06 AM Dictation workstation:   [id number]    CT 3D reconstruction    Result Date: 2/6/2025  Interpreted By:  Darren Kramer, STUDY: CT HEAD W/O CONTRAST TRAUMA PROTOCOL; CT CERVICAL SPINE WO IV CONTRAST; CT FACIAL BONES WO IV CONTRAST; CT 3D RECONSTRUCTION; 2/5/2025 11:23 pm   INDICATION: Signs/Symptoms:Fall, head injury; Signs/Symptoms:Fall, injury, dementia; Signs/Symptoms:Fall, injury; Signs/Symptoms:trauma   COMPARISON: 04/07/2024   ACCESSION NUMBER(S): [id number]; [id number]; [id number]; [id number]   ORDERING CLINICIAN: PREET TEJADA   TECHNIQUE: Axial noncontrast CT images of head with coronal and sagittal reconstructed images. Axial noncontrast CT images of the cervical spine with coronal and sagittal reconstructed images. Axial CT imaging of the facial bones performed. Three-dimensional reconstructions performed on a separate workstation.   FINDINGS: CT HEAD:   There is a small 3 mm subdural hematoma along the falx on image 24. There is also a trace amount of subarachnoid hemorrhage along the falx along the inferior frontal region on image 16. Trace subarachnoid also noted along the bilateral sylvian fissures.       No mass effect. No midline shift.   Right parietal encephalomalacia.   Global volume loss.   Large right frontal subcutaneous hematoma with soft tissue injury.   No acute skull fracture.   Minimal ethmoid sinus mucosal thickening   Vascular calcification     CT CERVICAL SPINE:   PREVERTEBRAL SOFT TISSUES: Within normal limits.   CRANIOCERVICAL JUNCTION: Intact.   ALIGNMENT:  No traumatic malalignment or traumatic facet widening.   VERTEBRAE:  No acute fracture. Vertebral body heights are maintained.   Mild  multilevel degenerative disc disease is seen. No evidence of acute cervical spine fracture.   CT face: Mildly comminuted distal nasal bone fracture. Soft tissue swelling. No orbital fracture.   No zygoma fracture.   Emphysema seen at the lung apices.       1. Global volume loss and chronic small vessel ischemic change. There is a large right sub cutaneous hematoma. There is also trace subdural and trace subarachnoid hemorrhage. No mass effect or midline shift.     No evidence of acute cervical spine fracture. Mild degenerative changes   Mildly-comminuted distal nasal bone fracture with soft tissue swelling.       Darren Kramer discussed the significance and urgency of this critical finding by epic chat with  clinical team on 2/6/2025 at 12:04 am. (**-RCF-**) Findings:  See findings.     Signed by: Darren Kramer 2/6/2025 12:06 AM Dictation workstation:   [id number]      Additional Labs:  Lab Results   Component Value Date    WBC 13.4 (H) 02/06/2025    WBC 12.4 (H) 02/05/2025    WBC 5.6 03/12/2024     02/06/2025     02/05/2025     03/12/2024     02/06/2025     02/05/2025     03/14/2024    K 3.6 02/06/2025    K 3.7 02/06/2025    K 6.1 (HH) 02/05/2025    BUN 16 02/06/2025    BUN 16 02/05/2025    BUN 23 03/14/2024    CREATININE 1.09 02/06/2025    CREATININE 1.09 02/05/2025    CREATININE 1.31 (H) 03/14/2024    MG 1.87 02/05/2025    MG 2.28 03/12/2024        Assessment   Assessment & Plan  Head injury, initial encounter      ASSESSMENT   74-year-old male with a history of CAD, PAD, COPD, dementia (A&O x1 at baseline), hypertension, dyslipidemia, prediabetes, and depression was sent to the ER by EMS after an unwitnessed fall. The patient is a poor historian with no recollection of the event. On arrival, he was alert and oriented to person and place, with a right forehead laceration, significant right periorbital swelling, and profuse nasal bleeding. Initial vitals showed  hypertension (/94), tachycardia (), and tachypnea (RR 24). Workup revealed leukocytosis (12.4), anemia (Hgb 12.2), hyperglycemia (118), and a hemolyzed potassium of 6.1. Imaging showed chronic small vessel ischemia, a large right subcutaneous hematoma, trace subdural and subarachnoid hemorrhage without mass effect, and a comminuted distal nasal bone fracture. The right forehead laceration was closed with sutures, and nasal packing with TXA was applied for epistaxis control. Neurosurgery recommended a stability scan in 6 hours, and trauma surgery advised ENT evaluation and hospital admission for close monitoring.     Plan    PLAN     Neuro:   #Right trace subdura and subarachnoid hemorrhage  #Right forehead laceration  #Unwitnessed fall  #Hx dementia  -ANO x 2 at baseline time 1.  -Initial CT scan showed a large right subcutaneous hematoma, and trace subdural and subarachnoid hemorrhage without mass effect or midline shift.   -Follow-up stability scan in 6 hours per neurosurgery.  -Frequent neuro checks for worsening mental status or new deficits.  -Maintain SBP <140 mmHg to minimize risk of hemorrhage expansion.  -Avoid anticoagulation/antiplatelets unless otherwise indicated.  -Continue supportive care and monitor for signs of increased ICP or deterioration.  -Neurosurgery consulted, appreciate recommendation.  -Pain: Tylenol   -Sedation: none   -Home meds: Will resume rivastigmine, fluoxetine, melatonin once med rec is done  -CIWA/COWS  -[facility] ICU    Cardiac:   #Sinus tachycardia  #Peripheral vascular disease  #Prolonged Qtc  #Hypertension  #HLD  -Initial EKG revealed sinus tachycardia with a left axis deviation, a ventricular rate of 107 bpm, and a QTc of 501.   -Continuous cardiac monitoring  -Maintain SBP <140 mmHg to minimize risk of hemorrhage expansion.  -Not requiring pressors  -Maintain Goal MAP > 65  -2D echo 3/2024 EF 45 to 50% with multiple segmental abnormalities with impaired relaxation  pattern with normal RSVP.  -Home meds: Hold aspirin, Plavix.  Will resume carvedilol, Imdur, lisinopril Nitrostat and cilostazol with holding parameters.  -Monitor and optimize electrolytes, keep K > 4.0, Mag > 2.0    Pulmonary:   -No acute issues  -Initial chest x-ray was negative for any acute cardiopulmonary process  -Imaging:   -Continuous pulse oximetry   -O2 PRN to maintain SpO2 > 94%, wean as tolerated  -Home meds: Spiriva  -OOB to chair, when appropriate        Gastrointestinal:   -No acute issues  -Diet: N.p.o. pending neurosurgery assessment  -Prophylaxis: Will resume home dose Protonix  -Bowel regimen: MiraLAX as needed  -Last BM:      Renal:   No acute issues  -Daily RFP,Mg,Phos  -Electrolytes: Replete per protocol, goal K>4 Phos >3 Mg >2  Net IO Since Admission: -200 mL [02/06/25 0554]    Results from last 72 hours   Lab Units 02/06/25  0145 02/05/25  2302   BUN mg/dL 16 16   CREATININE mg/dL 1.09 1.09         Endocrine:   -No acute issues  -Last hemoglobin A1c 11/2023 6.4   -Monitor blood glucose, continue Novolog SSC for glycemic control #1  -Accu-Cheks Q4  -Goal BS < 180  -Follow hypoglycemic protocol    Results from last 7 days   Lab Units 02/06/25  0206 02/06/25  0145 02/05/25  2302   POCT GLUCOSE mg/dL 112*  --   --    GLUCOSE mg/dL  --  130* 118*        Hematology:   #Acute anemia secondary to external blood loss  #Right n nostril bola epistaxis secondary to nasal septum laceration  -Monitor HH  -Daily CBC, coags  -Will transfuse to keep Hb above 7  -Home meds: Hold aspirin, Plavix  -DVT Prophylaxis: On hold in the setting of intracranial hemorrhage and multiple wounds.    Results from last 7 days   Lab Units 02/06/25  0405 02/05/25  2302   HEMOGLOBIN g/dL 10.9* 12.2*   HEMATOCRIT % 34.4* 38.2*   PLATELETS AUTO x10*3/uL 308 310       Infectious Disease:   -Leukocytosis  -No source of infection slightly elevated CRP with negative lactate and ESR of 63.  -Inflammatory marker  -Abx: Not currently  indicated  -Cultures: UA, pending    Results from last 7 days   Lab Units 25  0405 25  2302   WBC AUTO x10*3/uL 13.4* 12.4*     Temp (24hrs), Av.7 °C (98.1 °F), Min:36.6 °C (97.9 °F), Max:36.8 °C (98.2 °F)     Musculoskeletal:   #Comminuted distal nasal bone fracture   #Right nasal bleeding  -In the ED, an initial 6 cm Merocel nasal pack soaked with 10 cc of TXA was applied to the right naris, but the patient continued to bleed despite multiple pressure applications and dressing changes. ENT on-call, Dr. Amado, was consulted and recommended replacing the Merocel packing with a 10 cm pack, along with Afrin, TXA, and mupirocin ointment. However, the bleeding persisted, prompting Dr. Amado to evaluate the patient at the bedside, where he applied silver nitrate and Afrin-soaked cotton, successfully achieving hemostasis.    -Continue supportive care  -Patient continued to bleed we will apply more cotton soaked with Afrin  -He will be reassessed again later by ENT  -Outpatient follow-up for distal nasal fracture.  -No antibiotic is indicated to cover for dissociative syndrome.  -ENT consulted, appreciate recommendation  -PT/OT to evaluate      Lines/Tubes/Drains:   PIVs       Code status: Full Code     Dispo: Patient to remain in ICU    To be staffed with attending physician  Tyrone Ley MD   Internal Medicine, PGY-2 .    Attending Addendum:    I saw and evaluated the patient. I personally obtained the key and critical portions of the history and physical exam or was physically present for key and critical portions performed by the resident/fellow. I reviewed the resident/fellow's documentation and discussed the patient with the resident/fellow. I agree with the resident/fellow's medical decision making as documented in the note.     Critical care time is 35 minutes.

## 2025-02-06 NOTE — CONSULTS
Department of Neurological Surgery  Consult Note      Referral Diagnosis:   1. Head injury, initial encounter  Referral to ENT      2. Intracranial hemorrhage (Multi)        3. Closed fracture of nasal bone, initial encounter        4. Epistaxis due to trauma  Referral to ENT      5. Traumatic hematoma of forehead, initial encounter        6. Facial laceration, initial encounter         Intracranial hemorrhage (Multi) [I62.9]  Traumatic hematoma of forehead, initial encounter [S00.83XA]  Facial laceration, initial encounter [S01.81XA]  Closed fracture of nasal bone, initial encounter [S02.2XXA]  Head injury, initial encounter [S09.90XA]  Epistaxis due to trauma [R04.0]     History Of Present Illness  Bret Harmon is a 74 y.o. male with past medical history significant for COPD, CAD s/p stenting on Plavix, dementia, depression, dysphagia, hyperlipidemia, hypertension, and PVD who presented to the ED following an unwitnessed fall at his nursing home.  He is A&O x 1 at baseline.  Imaging studies upon workup show left convexity subdural hematoma measuring up to 3 mm in thickness on repeat studies as well as subarachnoid hemorrhage and parafalcine subdural hematoma components.  Neurosurgery consulted for further evaluation.    Past Medical History  Past Medical History:   Diagnosis Date    COPD (chronic obstructive pulmonary disease) (Multi)     Coronary artery disease     Dementia     Depression     Dysphagia     Hyperlipidemia     Hypertension     Insomnia     Muscle weakness     Peripheral vascular disease (CMS-HCC)     Prediabetes     Protein calorie malnutrition (Multi)        Surgical History  History reviewed. No pertinent surgical history.    Social History  Social History     Tobacco Use    Smoking status: Former     Types: Cigarettes   Substance Use Topics    Alcohol use: Not Currently    Drug use: Never       Allergies  Bee pollen  Medications Prior to Admission   Medication Sig Dispense Refill Last Dose/Taking     aspirin 81 mg chewable tablet Chew 1 tablet (81 mg) once daily.   2/5/2025 Morning    atorvastatin (Lipitor) 40 mg tablet Take 1 tablet (40 mg) by mouth once daily.   2/5/2025 Morning    bisacodyl (Dulcolax) 10 mg suppository Insert 1 suppository (10 mg) into the rectum once daily as needed for constipation.   Past Week    carvedilol (Coreg) 6.25 mg tablet Take 1 tablet (6.25 mg) by mouth 2 times a day.   2/5/2025 Bedtime    cilostazol (Pletal) 100 mg tablet Take 1 tablet (100 mg) by mouth 2 times a day.   2/5/2025 Bedtime    clopidogrel (Plavix) 75 mg tablet Take 1 tablet (75 mg) by mouth once daily.   2/5/2025 Morning    cyanocobalamin (Vitamin B-12) 250 mcg tablet Take 1 tablet (250 mcg) by mouth once daily.   2/5/2025 Morning    isosorbide mononitrate ER (Imdur) 30 mg 24 hr tablet Take 1 tablet (30 mg) by mouth once daily. Do not crush or chew.   2/5/2025 Morning    magnesium hydroxide (Milk of Magnesia) 400 mg/5 mL suspension Take 30 mL by mouth once daily as needed for constipation.   Past Week    melatonin 3 mg tablet Take 1 tablet (3 mg) by mouth once daily at bedtime.   2/5/2025 Bedtime    mirtazapine (Remeron) 7.5 mg tablet Take 1 tablet (7.5 mg) by mouth once daily at bedtime.   2/5/2025 Bedtime    nitroglycerin (Nitrostat) 0.4 mg SL tablet Place 1 tablet (0.4 mg) under the tongue every 5 minutes if needed for chest pain.   Past Week    pantoprazole (ProtoNix) 20 mg EC tablet Take 1 tablet (20 mg) by mouth once daily in the morning. Take before meals. Do not crush, chew, or split.   2/5/2025 Morning    rivastigmine (Exelon) 3 mg capsule Take 1 capsule (3 mg) by mouth 2 times a day.   2/5/2025 Bedtime    tiotropium (Spiriva Respimat) 2.5 mcg/actuation inhaler Inhale 2 puffs once daily.   2/5/2025 Morning    lisinopril 2.5 mg tablet Take 1 tablet (2.5 mg) by mouth once daily. (Patient not taking: Reported on 2/6/2025)   Not Taking       Review of Systems  14/14 systems reviewed and negative other than  "what is listed in the history of present illness    Physical Exam    General: Elderly, frail appearing. Alert. Oriented to self.   Skin: Warm and dry  ENMT: Mucous membranes moist, right periorbital edema   Head/Neck: Neck Supple. Laceration to the right forehead that has been repaired, bandage in place.  Respiratory/Thorax: Normal breath sounds with good chest expansion, thorax symmetric  Cardiovascular: No pitting edema    Motor Strength: HARR[place] spontaneously. Follows some basic commands intermittently.     Muscle Bulk: Normal and symmetric in all extremities      Last Recorded Vitals  Blood pressure 144/50, pulse 86, temperature 36.4 °C (97.5 °F), temperature source Temporal, resp. rate 20, height 1.803 m (5' 11\"), weight 69.1 kg (152 lb 5.4 oz), SpO2 96%.      Results for orders placed or performed during the hospital encounter of 02/05/25 (from the past 96 hours)   CBC and Auto Differential   Result Value Ref Range    WBC 12.4 (H) 4.4 - 11.3 x10*3/uL    nRBC 0.0 0.0 - 0.0 /100 WBCs    RBC 4.26 (L) 4.50 - 5.90 x10*6/uL    Hemoglobin 12.2 (L) 13.5 - 17.5 g/dL    Hematocrit 38.2 (L) 41.0 - 52.0 %    MCV 90 80 - 100 fL    MCH 28.6 26.0 - 34.0 pg    MCHC 31.9 (L) 32.0 - 36.0 g/dL    RDW 15.2 (H) 11.5 - 14.5 %    Platelets 310 150 - 450 x10*3/uL    Neutrophils % 66.8 40.0 - 80.0 %    Immature Granulocytes %, Automated 0.3 0.0 - 0.9 %    Lymphocytes % 22.8 13.0 - 44.0 %    Monocytes % 8.3 2.0 - 10.0 %    Eosinophils % 1.6 0.0 - 6.0 %    Basophils % 0.2 0.0 - 2.0 %    Neutrophils Absolute 8.30 (H) 1.60 - 5.50 x10*3/uL    Immature Granulocytes Absolute, Automated 0.04 0.00 - 0.50 x10*3/uL    Lymphocytes Absolute 2.83 0.80 - 3.00 x10*3/uL    Monocytes Absolute 1.03 (H) 0.05 - 0.80 x10*3/uL    Eosinophils Absolute 0.20 0.00 - 0.40 x10*3/uL    Basophils Absolute 0.03 0.00 - 0.10 x10*3/uL   Comprehensive Metabolic Panel   Result Value Ref Range    Glucose 118 (H) 74 - 99 mg/dL    Sodium 139 136 - 145 mmol/L    Potassium 6.1 " (HH) 3.5 - 5.3 mmol/L    Chloride 108 (H) 98 - 107 mmol/L    Bicarbonate 25 21 - 32 mmol/L    Anion Gap 12 10 - 20 mmol/L    Urea Nitrogen 16 6 - 23 mg/dL    Creatinine 1.09 0.50 - 1.30 mg/dL    eGFR 70 >60 mL/min/1.73m*2    Calcium 9.4 8.6 - 10.3 mg/dL    Albumin 3.9 3.4 - 5.0 g/dL    Alkaline Phosphatase 83 33 - 136 U/L    Total Protein 8.0 6.4 - 8.2 g/dL    AST 30 9 - 39 U/L    Bilirubin, Total 0.4 0.0 - 1.2 mg/dL    ALT 15 10 - 52 U/L   Magnesium   Result Value Ref Range    Magnesium 1.87 1.60 - 2.40 mg/dL   Troponin I, High Sensitivity, Initial   Result Value Ref Range    Troponin I, High Sensitivity 10 0 - 20 ng/L   Protime-INR   Result Value Ref Range    Protime 13.5 (H) 9.8 - 12.8 seconds    INR 1.2 (H) 0.9 - 1.1   Type And Screen   Result Value Ref Range    ABO TYPE AB     Rh TYPE POS     ANTIBODY SCREEN NEG    SST TOP   Result Value Ref Range    Extra Tube Hold for add-ons.    Sedimentation Rate   Result Value Ref Range    Sedimentation Rate 63 (H) 0 - 20 mm/h   ECG 12 lead   Result Value Ref Range    Ventricular Rate 107 BPM    Atrial Rate 107 BPM    MS Interval 130 ms    QRS Duration 126 ms    QT Interval 376 ms    QTC Calculation(Bazett) 501 ms    P Axis 69 degrees    R Axis -86 degrees    T Axis 72 degrees    QRS Count 17 beats    Q Onset 194 ms    P Onset 129 ms    P Offset 171 ms    T Offset 382 ms    QTC Fredericia 456 ms   ECG 12 lead   Result Value Ref Range    Ventricular Rate 110 BPM    Atrial Rate 110 BPM    MS Interval 150 ms    QRS Duration 136 ms    QT Interval 366 ms    QTC Calculation(Bazett) 495 ms    P Axis 73 degrees    R Axis 264 degrees    T Axis 72 degrees    QRS Count 18 beats    Q Onset 191 ms    P Onset 116 ms    P Offset 170 ms    T Offset 374 ms    QTC Fredericia 448 ms   Troponin, High Sensitivity, 1 Hour   Result Value Ref Range    Troponin I, High Sensitivity 11 0 - 20 ng/L   Potassium   Result Value Ref Range    Potassium 3.7 3.5 - 5.3 mmol/L   C-reactive protein   Result  Value Ref Range    C-Reactive Protein 1.96 (H) <1.00 mg/dL   Lactate   Result Value Ref Range    Lactate 1.6 0.4 - 2.0 mmol/L   Comprehensive metabolic panel   Result Value Ref Range    Glucose 130 (H) 74 - 99 mg/dL    Sodium 142 136 - 145 mmol/L    Potassium 3.6 3.5 - 5.3 mmol/L    Chloride 109 (H) 98 - 107 mmol/L    Bicarbonate 26 21 - 32 mmol/L    Anion Gap 11 10 - 20 mmol/L    Urea Nitrogen 16 6 - 23 mg/dL    Creatinine 1.09 0.50 - 1.30 mg/dL    eGFR 70 >60 mL/min/1.73m*2    Calcium 9.5 8.6 - 10.3 mg/dL    Albumin 3.7 3.4 - 5.0 g/dL    Alkaline Phosphatase 80 33 - 136 U/L    Total Protein 7.3 6.4 - 8.2 g/dL    AST 20 9 - 39 U/L    Bilirubin, Total 0.4 0.0 - 1.2 mg/dL    ALT 14 10 - 52 U/L   POCT GLUCOSE   Result Value Ref Range    POCT Glucose 112 (H) 74 - 99 mg/dL   CBC and Auto Differential   Result Value Ref Range    WBC 13.4 (H) 4.4 - 11.3 x10*3/uL    nRBC 0.0 0.0 - 0.0 /100 WBCs    RBC 3.90 (L) 4.50 - 5.90 x10*6/uL    Hemoglobin 10.9 (L) 13.5 - 17.5 g/dL    Hematocrit 34.4 (L) 41.0 - 52.0 %    MCV 88 80 - 100 fL    MCH 27.9 26.0 - 34.0 pg    MCHC 31.7 (L) 32.0 - 36.0 g/dL    RDW 15.2 (H) 11.5 - 14.5 %    Platelets 308 150 - 450 x10*3/uL    Neutrophils % 78.6 40.0 - 80.0 %    Immature Granulocytes %, Automated 0.4 0.0 - 0.9 %    Lymphocytes % 13.0 13.0 - 44.0 %    Monocytes % 7.0 2.0 - 10.0 %    Eosinophils % 0.6 0.0 - 6.0 %    Basophils % 0.4 0.0 - 2.0 %    Neutrophils Absolute 10.55 (H) 1.60 - 5.50 x10*3/uL    Immature Granulocytes Absolute, Automated 0.05 0.00 - 0.50 x10*3/uL    Lymphocytes Absolute 1.75 0.80 - 3.00 x10*3/uL    Monocytes Absolute 0.94 (H) 0.05 - 0.80 x10*3/uL    Eosinophils Absolute 0.08 0.00 - 0.40 x10*3/uL    Basophils Absolute 0.05 0.00 - 0.10 x10*3/uL   Light Blue Top   Result Value Ref Range    Extra Tube Hold for add-ons.    PST Top   Result Value Ref Range    Extra Tube Hold for add-ons.    Comprehensive metabolic panel   Result Value Ref Range    Glucose 130 (H) 74 - 99 mg/dL     Sodium 142 136 - 145 mmol/L    Potassium 3.7 3.5 - 5.3 mmol/L    Chloride 110 (H) 98 - 107 mmol/L    Bicarbonate 23 21 - 32 mmol/L    Anion Gap 13 10 - 20 mmol/L    Urea Nitrogen 16 6 - 23 mg/dL    Creatinine 0.98 0.50 - 1.30 mg/dL    eGFR 80 >60 mL/min/1.73m*2    Calcium 9.5 8.6 - 10.3 mg/dL    Albumin 3.6 3.4 - 5.0 g/dL    Alkaline Phosphatase 76 33 - 136 U/L    Total Protein 7.3 6.4 - 8.2 g/dL    AST 26 9 - 39 U/L    Bilirubin, Total 0.4 0.0 - 1.2 mg/dL    ALT 15 10 - 52 U/L   Magnesium   Result Value Ref Range    Magnesium 1.65 1.60 - 2.40 mg/dL   POCT GLUCOSE   Result Value Ref Range    POCT Glucose 123 (H) 74 - 99 mg/dL         Intake/Output Summary (Last 24 hours) at 2/6/2025 1029  Last data filed at 2/6/2025 0949  Gross per 24 hour   Intake 300 ml   Output 551 ml   Net -251 ml         Assessment and Plan     Assessment/Plan     Assessment & Plan  Head injury, initial encounter      Imaging studies independently reviewed and interpreted. There is evidence of left convexity subdural hematoma measuring 3 mm in thickness, as well as subarachnoid hemorrhage and parafalcine subdural hematoma. There is no mass effect or midline shift. There is no neurosurgical intervention warranted.   Ok to progress activity as tolerated. Ok for PT/OT/ST. Ok for diet from NS standpoint. SBP < 160. Ok for Q4 Neuro checks and to transfer out of ICU from NS standpoint. Plan for repeat CT head in 2 weeks. ASA/Plavix should be held until repeat CT completed.   Neurosurgery will sign off at this time.             Nataly Paiz APRN-[facility]  [address] [facility]  [address]  [address]  [place] [id number]     Phone: [phone]  Fax: [phone]

## 2025-02-06 NOTE — ED PROVIDER NOTES
HPI   No chief complaint on file.      Patient is a 74-year-old male who presents to the emergency department via EMS after an unwitnessed fall.  Patient is from a dementia care unit, reportedly alert and oriented to person only at baseline.  EMS states they were called for an unwitnessed fall.  Patient did have an obvious head injury.  Unsure if there was loss of consciousness.  EMS did not witness any loss of consciousness.  Patient is currently only admitting to pain in his head.  Denies any chest pain, abdominal pain.  History is severely limited due to the patient's mental status            Patient History   Past Medical History:   Diagnosis Date   • COPD (chronic obstructive pulmonary disease) (Multi)    • Coronary artery disease    • Dementia (Multi)    • Depression    • Dysphagia    • Hyperlipidemia    • Hypertension    • Insomnia    • Muscle weakness    • Peripheral vascular disease (CMS-[facility])    • Prediabetes    • Protein calorie malnutrition (Multi)      No past surgical history on file.  Family History   Family history unknown: Yes     Social History     Tobacco Use   • Smoking status: Former     Types: Cigarettes   • Smokeless tobacco: Not on file   Substance Use Topics   • Alcohol use: Not Currently   • Drug use: Never       Physical Exam   ED Triage Vitals [02/05/25 2255]   Temperature Heart Rate Respirations BP   36.8 °C (98.2 °F) (!) 15 20 (!) 188/94      Pulse Ox Temp src Heart Rate Source Patient Position   95 % -- -- --      BP Location FiO2 (%)     -- --       Physical Exam  Vitals reviewed.   Constitutional:       Comments: Chronically ill-appearing   HENT:      Head:      Comments: There is a large, 5 cm stellate lesion to the right forehead.  There is edema surrounding the right eye.  Patient has laceration to the bridge of the nose.  There is dried blood present in the bilateral nares with some active bleeding.     Mouth/Throat:      Mouth: Mucous membranes are dry.   Eyes:      Comments:  Pupils equal reactive bilaterally, 2-1   Neck:      Comments: Cervical collar in place.  Trachea midline  Cardiovascular:      Rate and Rhythm: Normal rate.      Heart sounds: No murmur heard.  Pulmonary:      Effort: Pulmonary effort is normal.      Comments: Diminished breath sounds bilaterally.  Abdominal:      General: Abdomen is flat.      Palpations: Abdomen is soft.      Tenderness: There is no abdominal tenderness.   Genitourinary:     Penis: Normal.       Comments: No blood at the urethral meatus  Musculoskeletal:         General: No swelling or deformity.      Comments: No obvious deformities with no tenderness to palpation in all 4 extremities.  Chronic amputations of the left toes   Skin:     General: Skin is warm and dry.   Neurological:      Mental Status: He is alert.      Comments: Patient opens eyes spontaneously, will answer questions and is alert to self.  He does note that he is in the hospital.  He is moving all 4 extremities.         ED Course & MDM   ED Course as of 02/06/25 0116   u Feb 06, 2025   0036 Discussed CT findings with neurosurgery.  Recommends 6-hour stability scan.  Okay with admission to ICU. [PS]   0044 Trauma surgery updated. [PS]   0103 Patient is excepted to the ICU by Dr. Delgado.  CCM is updated [PS]      ED Course User Index  [PS] Cortez Hollingsworth, DO         Diagnoses as of 02/06/25 0116   Head injury, initial encounter   Intracranial hemorrhage (Multi)   Closed fracture of nasal bone, initial encounter   Epistaxis due to trauma   Traumatic hematoma of forehead, initial encounter   Facial laceration, initial encounter                 No data recorded     [place] Coma Scale Score: 14 (02/05/25 2258 : Kory Ponce RN)                           Medical Decision Making  Patient is a 74-year-old male with a history of dementia presenting to the emergency department after a fall with head injury on anticoagulation.  Patient is immediately seen as a limited trauma.  There  is a laceration to the right forehead with obvious head trauma.  CT scans of the head, facial bones and cervical spine are ordered.  As the fall was unwitnessed and the patient does have a history of dementia, cardiac workup is initiated.  EKG: Sinus rhythm at 107 bpm.  .  .  QTc 501.  Right bundle branch block pattern is present.  No obvious acute ischemic pattern.  Bundle branch block does appear new from previous EKG obtained on June 12, 2015  Patient does have leukocytosis.  No infectious source is obvious at this time.  Patient had initial potassium of 6.1, this is hemolyzed.  Repeat potassium is sent.  CT imaging revealed small, 3 mm subdural hematoma along the falx in addition to trace subarachnoid hemorrhage.  Case is discussed with neurosurgery, Dr. Sarah.  Recommended stability scan in 6 hours.  Trauma surgery is updated.  Case is discussed with the ICU,           Procedure  Critical Care    Performed by: Cortez Hollingsworth DO  Authorized by: Cortez Hollingsworth DO    Critical care provider statement:     Critical care time (minutes):  40    Critical care time was exclusive of:  Separately billable procedures and treating other patients    Critical care was necessary to treat or prevent imminent or life-threatening deterioration of the following conditions:  Trauma    Critical care was time spent personally by me on the following activities:  Examination of patient, obtaining history from patient or surrogate, ordering and performing treatments and interventions, ordering and review of laboratory studies, ordering and review of radiographic studies, re-evaluation of patient's condition and review of old charts  Laceration Repair    Performed by: Cortez Hollingsworth DO  Authorized by: Cortez Hollingsworth DO    Consent:     Consent obtained:  Emergent situation  Laceration details:     Location:  Face    Face location:  Forehead    Length (cm):  5  Pre-procedure details:     Preparation:  Patient  was prepped and draped in usual sterile fashion  Exploration:     Hemostasis achieved with:  Epinephrine  Treatment:     Area cleansed with:  Saline    Amount of cleaning:  Extensive    Irrigation solution:  Sterile saline    Irrigation method:  Syringe    Layers/structures repaired:  Deep dermal/superficial fascia  Deep dermal/superficial fascia:     Suture size:  4-0    Suture material:  Chromic gut    Suture technique:  Simple interrupted    Number of sutures:  2  Skin repair:     Repair method:  Sutures    Suture size:  4-0    Suture material:  Nylon    Suture technique:  Simple interrupted    Number of sutures:  15  Approximation:     Approximation:  Loose  Repair type:     Repair type:  Complex  Post-procedure details:     Dressing:  Bulky dressing    Procedure completion:  Tolerated well, no immediate complications  Epistaxis Management    Performed by: Cortez Hollingsworth DO  Authorized by: Cortez Hollingsworth DO    Consent:     Consent obtained:  Emergent situation  Anesthesia:     Anesthesia method:  None  Procedure details:     Treatment site:  R anterior    Treatment method:  Nasal tampon  Post-procedure details:     Assessment:  Bleeding decreased    Procedure completion:  Tolerated well, no immediate complications  Comments:      8 cm Merisel, trimmed to 6 cm.  Applied to the right naris.  10 cc of TXA was used to impregnate the gauze via 18-gauge catheter.

## 2025-02-06 NOTE — PROGRESS NOTES
Pt has dc orders and can return to [facility] today.  Request to [facility] to send dc paperwork and arrange transportation.  Confirmed for 7:00pm; facility, bedside RN, TCC and pt's POA/sister Keesha (left voicemail) all aware.

## 2025-02-06 NOTE — DISCHARGE INSTRUCTIONS
You are hospitalized due to a fall at your facility.  You hit your head, which resulted in a laceration to your forehead as well as a broken nose.  In addition, you did have some bleeding on your brain, specifically a subdural hematoma and a subarachnoid hemorrhage.  Your case was discussed with neurosurgery, who recommended repeat stability scan 6 hours after the first, which ultimately showed no significant increase in the size of the brain bleed.  Thus, you are deemed stable for discharge from a neurological perspective.  In addition, you do have a broken nose and were seen by ENT due to a nosebleed.  This was treated with Afrin as well as other modalities to help control the bleeding.    You have 15 sutures in your head due to a laceration across your forehead. These can be removed in 7-10 days, depending on wound healing.     Please follow up with your primary care provider within 7 days for hospital follow-up. Please call to make this appointment.  Please follow-up with ENT, Dr. Chente Amado, on 2/12/25 at 2:45pm at his [facility] ([address] [place] [address], [address]). At that time, he will reassess your nose fracture.    In addition, neurosurgery would like you to get an updated CT scan of your head in 2 weeks time. An order for this has been provided.  They will call with the results of the scan and schedule additional follow-up if needed.    Please take your medications as prescribed.  We have prescribed Keppra 500mg to be taken twice daily to help prevent seizures. Please take this for 7 days.   Please DO NOT TAKE your aspirin, clopidogrel, or cilostazol for at least two weeks. After the CT scan is completed, you may resume these medications.     For the first 24 hours, please keep the right nasal packing in place to control bleeding.  2 puffs of Afrin nasal spray can be used as needed to saturate the cotton ball.    If your nose were to bleed again, a cotton ball with Afrin can be used to help control  bleeding. This intervention can be used for 4 days after discharge.  Please do not blow your nose until you follow up with ENT. You may gently wipe your nose. You may use ice as needed on your nose for any swelling.     If you have any concerning symptoms, or worsening symptoms please call or return, such as chest pain, shortness of breath, new onset confusion, loss of sensation, or fever >103F.    Thank you for allowing us to participate in your medical care!    -[facility] inpatient medicine teaching service

## 2025-02-06 NOTE — SIGNIFICANT EVENT
Called by the ED to discuss this gentleman.  This is a 74-year-old male who resides in a nursing home, has dementia, and was found to have sustained an unwitnessed fall.  The patient presents with facial trauma and a CT scan is found to have a nasal bone fracture and a very small, trace subdural and subarachnoid hemorrhage.  Neurosurgery was called to discuss this patient and he will be admitted to the ICU with a repeat head CT in 6 hours, sooner if mental status changes are noticed.  I recommend discussing nasal bone fracture with ENT.  All labs were reviewed and there is a slight leukocytosis with left shift for which I recommend further investigating with a UA, chest x-ray is normal.  CMP was hemolyzed and will be repeated.  There is no other acute traumatic or ACS interventions recommended.  Follow-up per neurosurgery.

## 2025-02-06 NOTE — NURSING NOTE
Pt to room 2124 from ED. Slow, continuous bleeding from R nare packing.  Dr Ley at bedside.  Pt maintaining airway  0440: Bleeding unchanged. ENT consult, Dr Amado at bedside.   0530: R nare treated with silver nitrate and Afrin per Dr Amado. No further bleeding. No resp distress. Pt maintaining airway..

## 2025-02-06 NOTE — PROGRESS NOTES
Spiritual Care Visit  Spiritual Care Request    Reason for Visit:  Routine Visit: Introduction     Request Received From:       Focus of Care:  Visited With: Patient         Refer to :          Spiritual Care Assessment    Spiritual Assessment:                      Care Provided:  Intended Effects: Promote sense of peace, Preserve dignity and respect, Meaning-making  Methods: Offer support  Interventions: Share words of hope and inspiration    Sense of Community and or Pentecostal Affiliation:  None         Addressed Needs/Concerns and/or David Through:          Outcome:        Advance Directives:         Spiritual Care Annotation    Annotation:   was a supportive presence.

## 2025-02-06 NOTE — PROGRESS NOTES
"Nutrition Initial Assessment:   Nutrition Assessment    Reason for Assessment: Provider consult order    Nutrition Note:  Bret Harmon is a 74 y.o. male presenting 2/5 from [facility] s/p unwitnessed fall. Work up revealing + nasal bone fracture, persistent right nare epistaxis, right forehead laceration, large right subcutaneous hematoma, small SDH and SAH. ENT on consult for nasal bone fx and epistaxis; Neuro Surgery too on consult.     Past Medical History   has a past medical history of COPD (chronic obstructive pulmonary disease) (Multi), Coronary artery disease, Dementia, Depression, Dysphagia, Hyperlipidemia, Hypertension, Insomnia, Muscle weakness, Peripheral vascular disease (CMS-[facility]), Prediabetes, and Protein calorie malnutrition (Multi).  Surgical History   has no past surgical history on file.       Nutrition History:  Energy Intake: Fair 50-75 %, Poor < 50 %  Food and Nutrient History: Pt from [facility]; there on cardiac/regular/thin liquids with [employer] 3x/day. Spoke with Joellen from [facility]--relates pt does best with sweets that his brother brings in; appetite is hit or miss. Pt takes meds crushed in applesauce or pudding. Relates stomach flu or noravirus going around dementia unit where pt lives--pt had a very large/loose BM 2/5--(none since admit @ [facility]).  Staff there noticed pt has been having more difficulties feeding self as well as over all decline in ADLs x1 week. Pt's POA is his oldest sister however brother more present. Pt with a \"lady friend, Azucena and family had asked staff to not share information about pt with Azucena.\"--[facility] RN aware.  Vitamin/Herbal Supplement Use: home meds include B12, melatonin, remeron.  Food Allergy:  (NKFA)  Food Intolerance:  (no intolerances reported)       Anthropometrics:  Height: 180.3 cm (5' 11\")   Weight: 69.1 kg (152 lb 5.4 oz)   BMI (Calculated): 21.26   Admit weight measured 69.1kg      Critical-Care Pain Observation Score:  [0-3]   PAINAD Score:  [5]  "     Weight History:   Per [facility] staff, pt weight 148-151# over the past 6 months (67.3-68.6kg)  4/2024: 77.1kg or 10% <1 yr     Weight Change %: not significant loss  Significant Weight Loss: No    Nutrition Focused Physical Exam Findings:    Subcutaneous Fat Loss:   Orbital Fat Pads: Defer (head wrapped in gauze)  Buccal Fat Pads: Mild-Moderate (flat cheeks, minimal bounce)  Triceps: Defer  Ribs: Defer  Muscle Wasting:  Temporalis: Defer (head wrapped in gauze)  Pectoralis (Clavicular Region): Mild-Moderate (some protrusion of clavicle)  Interosseous: Mild-Moderate (slightly depressed area between thumb and forefinger)  Trapezius/Infraspinatus/Supraspinatus (Scapular Region): Defer  Quadriceps: Defer  Gastrocnemius: Defer  Edema:  Edema Location: right eye swollen shut  Physical Findings:  Skin: Positive  Positive Skin Findings:  (right forehead laceration sutured closed, bruising/ecchymosis)  Mouth Findings: Dysphagia (history of dysphagia however on regular food/fluids PTA)    Nutrition Significant Labs:  BMP Trend:   Results from last 7 days   Lab Units 02/06/25  0405 02/06/25  0145 02/06/25  0046 02/05/25  2302   GLUCOSE mg/dL 130* 130*  --  118*   CALCIUM mg/dL 9.5 9.5  --  9.4   SODIUM mmol/L 142 142  --  139   POTASSIUM mmol/L 3.7 3.6   < > 6.1*   CO2 mmol/L 23 26  --  25   CHLORIDE mmol/L 110* 109*  --  108*   BUN mg/dL 16 16  --  16   CREATININE mg/dL 0.98 1.09  --  1.09    < > = values in this interval not displayed.    , A1C:  Lab Results   Component Value Date    HGBA1C 6.4 (H) 11/13/2023   , BG POCT trend:   Results from last 7 days   Lab Units 02/06/25  1135 02/06/25  0733 02/06/25  0206   POCT GLUCOSE mg/dL 101* 123* 112*    , Liver Function Trend:   Results from last 7 days   Lab Units 02/06/25  0405 02/06/25  0145 02/05/25  2302   ALK PHOS U/L 76 80 83   AST U/L 26 20 30   ALT U/L 15 14 15   BILIRUBIN TOTAL mg/dL 0.4 0.4 0.4        Nutrition Specific Medications:  Scheduled medications  atorvastatin,  "40 mg, oral, Nightly  carvedilol, 6.25 mg, oral, BID  [Held by provider] cilostazol, 100 mg, oral, BID  insulin lispro, 0-5 Units, subcutaneous, q4h  ipratropium, 0.5 mg, nebulization, q6h  isosorbide mononitrate ER, 30 mg, oral, Daily  melatonin, 3 mg, oral, Nightly  mirtazapine, 7.5 mg, oral, Nightly  mupirocin, , Topical, TID  [START ON 2/7/2025] pantoprazole, 20 mg, oral, Daily before breakfast  pantoprazole, 40 mg, intravenous, Daily  rivastigmine, 3 mg, oral, BID  sodium chloride, 500 mL, intravenous, Once      Continuous medications     PRN medications  PRN medications: acetaminophen, bisacodyl, dextrose, dextrose, glucagon, glucagon, guaiFENesin, hydrALAZINE, labetaloL, oxymetazoline, polyethylene glycol     I/O:   Last BM Date: 02/06/25; Stool Appearance: Soft (02/06/25 0209)    Dietary Orders (From admission, onward)       Start     Ordered    02/06/25 1026  Adult diet Regular  Diet effective now        Question:  Diet type  Answer:  Regular    02/06/25 1026                     Estimated Needs:   Total Energy Estimated Needs in 24 hours (kCal):  (1700-2000kcal (25-30kcal/kg of 69.1kg))     Total Protein Estimated Needs in 24 Hours (g):  (76-90g pro (1.1-1.3g/kg of 69.1kg))     Total Fluid Estimated Needs in 24 Hours (mL):  (1mL/kcal/d or as per physician)           Nutrition Diagnosis   Malnutrition Diagnosis  Patient has Malnutrition Diagnosis: No    Nutrition Diagnosis  Patient has Nutrition Diagnosis: Yes  Diagnosis Status (1): New  Nutrition Diagnosis 1: Increased nutrient needs  Related to (1): acute on chronic metabolic stress  As Evidenced by (1): fall with resulting nasal bone fx and CHI, current refusal to eat with inconsistent oral intakes PTA with history of dementia.  Additional Assessment Information (1): 2/6: Case discussed with nurse and CCM team during acute care rounds. Nurse aware to crush meds in pudding or applesauce. Pt refused breakfat \"maybe later.\"       Nutrition " "Interventions/Recommendations   Nutrition prescription for oral nutrition    Nutrition Recommendations:  Individualized Nutrition Prescription Provided for : oral intakes to meet >75% of estimated nutrient needs    Nutrition Interventions/Goals:   Interventions: Meals and snacks, Medical food supplement  Meals and Snacks: General healthful diet  Goal: continue with regular diet--staff to help order and feed PRN  Medical Food Supplement: Commercial beverage medical food supplement therapy, Commercial food medical food supplement therapy  Goal: Ensure Plus HP 2x/day for additional 700kcal and 40g pro/day as well as Magic Cup 2x/day and PRN for additional 580kcal and 18g pro/day.  Coordination of Care with Providers: Nursing, Provider (CIELO John and CCM team during pt care rounds.)      Education Documentation  No documentation found.     2/6: Met with pt at bedside--offered to order meal however pt refusing \"maybe later.\" Pt from memory care unit where all meals are provided.       Nutrition Monitoring and Evaluation   Food/Nutrient Related History Monitoring  Monitoring and Evaluation Plan: Estimated Energy Intake  Estimated Energy Intake: Energy intake greater or equal to 75% of estimated energy needs    Anthropometric Measurements  Monitoring and Evaluation Plan: Body weight  Body Weight: Body weight - Maintain stable weight    Biochemical Data, Medical Tests and Procedures  Monitoring and Evaluation Plan: Electrolyte/renal panel  Electrolyte and Renal Panel: Electrolytes within normal limits    Physical Exam Findings  Monitoring and Evaluation Plan: Digestive System, Skin  Digestive System Finding: Other (Comment) (poor po intakes)  Criteria: consume >50% of meals and >75% of ONS  Skin Finding: Imparied wound healing - Skin to heal         Time Spent (min): 60 minutes        "

## 2025-02-07 LAB
ATRIAL RATE: 110 BPM
HOLD SPECIMEN: NORMAL
P AXIS: 73 DEGREES
P OFFSET: 170 MS
P ONSET: 116 MS
PR INTERVAL: 150 MS
Q ONSET: 191 MS
QRS COUNT: 18 BEATS
QRS DURATION: 136 MS
QT INTERVAL: 366 MS
QTC CALCULATION(BAZETT): 495 MS
QTC FREDERICIA: 448 MS
R AXIS: 264 DEGREES
T AXIS: 72 DEGREES
T OFFSET: 374 MS
VENTRICULAR RATE: 110 BPM

## 2025-02-07 NOTE — NURSING NOTE
Report given to Physician's ambulance.  No change in assessment.  Upper denture in pt's mouth. All paperwork given to crew.  Pt departed ICU in [facility].

## 2025-02-07 NOTE — H&P
Past Medical History  He has a past medical history of COPD (chronic obstructive pulmonary disease) (Multi), Coronary artery disease, Dementia, Depression, Dysphagia, Hyperlipidemia, Hypertension, Insomnia, Muscle weakness, Peripheral vascular disease (CMS-[facility]), Prediabetes, and Protein calorie malnutrition (Multi).    Surgical History  He has no past surgical history on file.     Social History  He reports that he has quit smoking. His smoking use included cigarettes. He does not have any smokeless tobacco history on file. He reports that he does not currently use alcohol. He reports that he does not use drugs.    Family History  Family History   Family history unknown: Yes        Allergies  Bee pollen    Review of Systems     Physical Exam     Last Recorded Vitals  /65   Pulse 66   Temp 36.5 °C (97.7 °F) (Temporal)   Resp 19   Wt 69.1 kg (152 lb 5.4 oz)   SpO2 97%     Relevant Results  {If you would like to pull in Medications, type .meds     If you would like to pull in Lab results for the last 24 hours, type .[id number]    If you would like to pull in Imaging results, type .[id number] :[id number]}      ***     Assessment/Plan   Assessment & Plan  Head injury, initial encounter      ***       Shon Ford MD     pulmonary disease) (Multi), Coronary artery disease, Dementia, Depression, Dysphagia, Hyperlipidemia, Hypertension, Insomnia, Muscle weakness, Peripheral vascular disease (CMS-HCC), Prediabetes, and Protein calorie malnutrition (Multi).    Surgical History  He has no past surgical history on file.     Social History  He reports that he has quit smoking. His smoking use included cigarettes. He does not have any smokeless tobacco history on file. He reports that he does not currently use alcohol. He reports that he does not use drugs.    Family History  Family History   Family history unknown: Yes            Last Recorded Vitals  /65   Pulse 66   Temp 36.5 °C (97.7 °F) (Temporal)   Resp 19   Wt 69.1 kg (152 lb 5.4 oz)   SpO2 97%     Relevant Results           Shon Ford MD

## 2025-02-12 ENCOUNTER — APPOINTMENT (OUTPATIENT)
Facility: CLINIC | Age: 75
End: 2025-02-12
Payer: COMMERCIAL

## 2025-02-12 VITALS
SYSTOLIC BLOOD PRESSURE: 128 MMHG | HEIGHT: 71 IN | WEIGHT: 152 LBS | BODY MASS INDEX: 21.28 KG/M2 | TEMPERATURE: 97.4 F | DIASTOLIC BLOOD PRESSURE: 81 MMHG

## 2025-02-12 DIAGNOSIS — S02.2XXA CLOSED NONDISPLACED FRACTURE OF NASAL BONE, INITIAL ENCOUNTER: Primary | ICD-10-CM

## 2025-02-12 DIAGNOSIS — J34.89 NASAL MUCOSA DRY: ICD-10-CM

## 2025-02-12 DIAGNOSIS — Z91.81 STATUS POST FALL: ICD-10-CM

## 2025-02-12 PROCEDURE — 1159F MED LIST DOCD IN RCRD: CPT | Performed by: OTOLARYNGOLOGY

## 2025-02-12 PROCEDURE — 1157F ADVNC CARE PLAN IN RCRD: CPT | Performed by: OTOLARYNGOLOGY

## 2025-02-12 PROCEDURE — 99214 OFFICE O/P EST MOD 30 MIN: CPT | Performed by: OTOLARYNGOLOGY

## 2025-02-12 PROCEDURE — 1160F RVW MEDS BY RX/DR IN RCRD: CPT | Performed by: OTOLARYNGOLOGY

## 2025-02-12 PROCEDURE — 1111F DSCHRG MED/CURRENT MED MERGE: CPT | Performed by: OTOLARYNGOLOGY

## 2025-02-12 NOTE — PROGRESS NOTES
Impression:  1. Closed nondisplaced fracture of nasal bone, initial encounter        2. Nasal mucosa dry        3. Status post fall             RECOMMENDATIONS/PLAN :  I reassured the patient and caregiver that his nasal bones are straight and I do not recommend any surgical intervention.  However his nose is extremely dry so I recommend saline nasal spray-2 puffs in each nostril 3 times daily for the next 10 days or as needed for a dry nose.  They will continue local wound care measures regarding his forehead laceration and he has a follow-up appointment with his family physician to have the sutures removed in the future.  They can apply bacitracin to the incision twice daily to keep it moist.      **This electronic medical record note was created with the use of voice recognition software.  Despite proofreading, typographical or grammatical errors may be present that could affect meaning of content **    Subjective   Patient ID:     Bret Harmon is a 74 y.o. male who presents to the office today after he recently had a fall while he was at a nursing facility.  He sustained a forehead laceration that was repaired in the emergency department.  He also sustained a nondisplaced closed nasal bone fracture with previous epistaxis.  That has resolved however his nose is still extremely dry.  His caregiver denies any bleeding from the nose recently or any clear fluid draining of the nose or any infectious drainage.  No recent fever or chills.    ROS:  A detailed 12 system review of systems is noted on the intake form has been reviewed with the patient with details noted in the HPI and scanned into the patient's medical record.    Objective     Past Medical History:   Diagnosis Date    COPD (chronic obstructive pulmonary disease) (Multi)     Coronary artery disease     Dementia     Depression     Dysphagia     Hyperlipidemia     Hypertension     Insomnia     Muscle weakness     Peripheral vascular disease (CMS-HCC)      Prediabetes     Protein calorie malnutrition (Multi)         History reviewed. No pertinent surgical history.     Allergies   Allergen Reactions    Bee Pollen Runny nose          Current Outpatient Medications:     atorvastatin (Lipitor) 40 mg tablet, Take 1 tablet (40 mg) by mouth once daily., Disp: , Rfl:     bisacodyl (Dulcolax) 10 mg suppository, Insert 1 suppository (10 mg) into the rectum once daily as needed for constipation., Disp: , Rfl:     carvedilol (Coreg) 6.25 mg tablet, Take 1 tablet (6.25 mg) by mouth 2 times a day., Disp: , Rfl:     cyanocobalamin (Vitamin B-12) 250 mcg tablet, Take 1 tablet (250 mcg) by mouth once daily., Disp: , Rfl:     isosorbide mononitrate ER (Imdur) 30 mg 24 hr tablet, Take 1 tablet (30 mg) by mouth once daily. Do not crush or chew., Disp: , Rfl:     levETIRAcetam (Keppra) 500 mg tablet, Take 1 tablet (500 mg) by mouth 2 times a day for 14 doses., Disp: , Rfl:     magnesium hydroxide (Milk of Magnesia) 400 mg/5 mL suspension, Take 30 mL by mouth once daily as needed for constipation., Disp: , Rfl:     melatonin 3 mg tablet, Take 1 tablet (3 mg) by mouth once daily at bedtime., Disp: , Rfl:     mirtazapine (Remeron) 7.5 mg tablet, Take 1 tablet (7.5 mg) by mouth once daily at bedtime., Disp: , Rfl:     pantoprazole (ProtoNix) 20 mg EC tablet, Take 1 tablet (20 mg) by mouth once daily in the morning. Take before meals. Do not crush, chew, or split., Disp: , Rfl:     rivastigmine (Exelon) 3 mg capsule, Take 1 capsule (3 mg) by mouth 2 times a day., Disp: , Rfl:     nitroglycerin (Nitrostat) 0.4 mg SL tablet, Place 1 tablet (0.4 mg) under the tongue every 5 minutes if needed for chest pain., Disp: , Rfl:     oxymetazoline (Afrin) 0.05 % nasal spray, Administer 2 sprays into each nostril every 12 hours if needed for congestion for up to 4 days. Do not use for more than 3 days., Disp: , Rfl:     tiotropium (Spiriva Respimat) 2.5 mcg/actuation inhaler, Inhale 2 puffs once daily.,  "Disp: , Rfl:      Tobacco Use: Medium Risk (2/12/2025)    Patient History     Smoking Tobacco Use: Former     Smokeless Tobacco Use: Unknown     Passive Exposure: Not on file        Alcohol Use: Patient Unable To Answer (2/6/2025)    AUDIT-C     Frequency of Alcohol Consumption: Patient unable to answer     Average Number of Drinks: Patient unable to answer     Frequency of Binge Drinking: Patient unable to answer        Social History     Substance and Sexual Activity   Drug Use Never        Physical Exam:  Visit Vitals  /81   Temp 36.3 °C (97.4 °F) (Temporal)   Ht 1.803 m (5' 11\")   Wt 68.9 kg (152 lb)   BMI 21.20 kg/m²   Smoking Status Former   BSA 1.86 m²      General: Patient is alert, oriented, cooperative in no apparent distress.  Head: Right forehead laceration with sutures in place.  No overlying erythema.  No evidence of hematoma or seroma.  Eyes: PERRL, EOMI, Conjunctiva is clear. No nystagmus.  Ears: Right Ear-- Pinna is normal.  External auditory canal is patent. Tympanic membrane is [intact, translucent and has good mobility with my pneumatic otoscope. No effusion].  Mastoid is nontender.  Left ear-- Pinna is normal.  External auditory canal is patent. Tympanic membrane is [intact, translucent and has good mobility with my pneumatic otoscope.  No effusion].  Mastoid is nontender.  External nose: Nasal bones remain relatively straight.  No palpable step-off.  Nose: Septum is relatively straight and the mucosa is somewhat dry.  Minimal old blood within the nose..  No septal perforation or lesions. No septal hematoma/ seroma.  No signs of bleeding.  Inferior turbinates are normal.   No evidence of intranasal polyps.  No infectious drainage.  Throat:  Floor of mouth is clear, no masses.  Tongue appears normal, no lesions or masses. Gums, gingiva, buccal mucosa appear pink and moist, no lesions.  Peritonsillar regions appear symmetric without swelling.  Hard and soft palate appear normal, no obvious " cleft. Uvula is midline.  Oropharynx: No lesions. Retropharyngeal wall is flat.  No active postnasal drip.  Neck: Supple,  no lymphadenopathy.  No masses.  Salivary Glands: Symmetric bilaterally.  No palpable masses.  No evidence of acute infection or salivary stones  Neurologic: Cranial Nerves 2-12 are grossly intact without focal deficits. Cerebellar function testing is normal.     Results:   []    Procedure:   []    Chente Amado, DO

## 2025-02-15 ENCOUNTER — APPOINTMENT (OUTPATIENT)
Dept: RADIOLOGY | Facility: HOSPITAL | Age: 75
End: 2025-02-15
Payer: COMMERCIAL

## 2025-02-15 ENCOUNTER — HOSPITAL ENCOUNTER (EMERGENCY)
Facility: HOSPITAL | Age: 75
Discharge: HOME | End: 2025-02-15
Attending: EMERGENCY MEDICINE
Payer: COMMERCIAL

## 2025-02-15 ENCOUNTER — APPOINTMENT (OUTPATIENT)
Dept: CARDIOLOGY | Facility: HOSPITAL | Age: 75
End: 2025-02-15
Payer: COMMERCIAL

## 2025-02-15 VITALS
HEIGHT: 67 IN | OXYGEN SATURATION: 96 % | DIASTOLIC BLOOD PRESSURE: 79 MMHG | SYSTOLIC BLOOD PRESSURE: 176 MMHG | TEMPERATURE: 97.7 F | WEIGHT: 152 LBS | RESPIRATION RATE: 16 BRPM | BODY MASS INDEX: 23.86 KG/M2 | HEART RATE: 71 BPM

## 2025-02-15 DIAGNOSIS — I60.9 SUBARACHNOID BLEED (MULTI): Primary | ICD-10-CM

## 2025-02-15 LAB
ALBUMIN SERPL BCP-MCNC: 3.8 G/DL (ref 3.4–5)
ALP SERPL-CCNC: 77 U/L (ref 33–136)
ALT SERPL W P-5'-P-CCNC: 12 U/L (ref 10–52)
ANION GAP SERPL CALC-SCNC: 13 MMOL/L (ref 10–20)
AST SERPL W P-5'-P-CCNC: 14 U/L (ref 9–39)
BASOPHILS # BLD AUTO: 0.07 X10*3/UL (ref 0–0.1)
BASOPHILS NFR BLD AUTO: 0.7 %
BILIRUB SERPL-MCNC: 0.3 MG/DL (ref 0–1.2)
BUN SERPL-MCNC: 18 MG/DL (ref 6–23)
CALCIUM SERPL-MCNC: 9.8 MG/DL (ref 8.6–10.3)
CARDIAC TROPONIN I PNL SERPL HS: 12 NG/L (ref 0–20)
CHLORIDE SERPL-SCNC: 109 MMOL/L (ref 98–107)
CO2 SERPL-SCNC: 27 MMOL/L (ref 21–32)
CREAT SERPL-MCNC: 0.97 MG/DL (ref 0.5–1.3)
EGFRCR SERPLBLD CKD-EPI 2021: 81 ML/MIN/1.73M*2
EOSINOPHIL # BLD AUTO: 0.19 X10*3/UL (ref 0–0.4)
EOSINOPHIL NFR BLD AUTO: 1.8 %
ERYTHROCYTE [DISTWIDTH] IN BLOOD BY AUTOMATED COUNT: 14.8 % (ref 11.5–14.5)
GLUCOSE BLD MANUAL STRIP-MCNC: 82 MG/DL (ref 74–99)
GLUCOSE SERPL-MCNC: 108 MG/DL (ref 74–99)
HCT VFR BLD AUTO: 34.5 % (ref 41–52)
HGB BLD-MCNC: 10.3 G/DL (ref 13.5–17.5)
IMM GRANULOCYTES # BLD AUTO: 0.05 X10*3/UL (ref 0–0.5)
IMM GRANULOCYTES NFR BLD AUTO: 0.5 % (ref 0–0.9)
LYMPHOCYTES # BLD AUTO: 2.2 X10*3/UL (ref 0.8–3)
LYMPHOCYTES NFR BLD AUTO: 21.3 %
MCH RBC QN AUTO: 27.5 PG (ref 26–34)
MCHC RBC AUTO-ENTMCNC: 29.9 G/DL (ref 32–36)
MCV RBC AUTO: 92 FL (ref 80–100)
MONOCYTES # BLD AUTO: 0.75 X10*3/UL (ref 0.05–0.8)
MONOCYTES NFR BLD AUTO: 7.2 %
NEUTROPHILS # BLD AUTO: 7.09 X10*3/UL (ref 1.6–5.5)
NEUTROPHILS NFR BLD AUTO: 68.5 %
NRBC BLD-RTO: 0 /100 WBCS (ref 0–0)
PLATELET # BLD AUTO: 552 X10*3/UL (ref 150–450)
POLYCHROMASIA BLD QL SMEAR: NORMAL
POTASSIUM SERPL-SCNC: 4.2 MMOL/L (ref 3.5–5.3)
PROT SERPL-MCNC: 8.4 G/DL (ref 6.4–8.2)
RBC # BLD AUTO: 3.75 X10*6/UL (ref 4.5–5.9)
RBC MORPH BLD: NORMAL
SODIUM SERPL-SCNC: 145 MMOL/L (ref 136–145)
WBC # BLD AUTO: 10.4 X10*3/UL (ref 4.4–11.3)

## 2025-02-15 PROCEDURE — 84484 ASSAY OF TROPONIN QUANT: CPT

## 2025-02-15 PROCEDURE — 96374 THER/PROPH/DIAG INJ IV PUSH: CPT | Performed by: EMERGENCY MEDICINE

## 2025-02-15 PROCEDURE — 72125 CT NECK SPINE W/O DYE: CPT | Performed by: RADIOLOGY

## 2025-02-15 PROCEDURE — 99291 CRITICAL CARE FIRST HOUR: CPT | Performed by: EMERGENCY MEDICINE

## 2025-02-15 PROCEDURE — 96375 TX/PRO/DX INJ NEW DRUG ADDON: CPT | Performed by: EMERGENCY MEDICINE

## 2025-02-15 PROCEDURE — 93005 ELECTROCARDIOGRAM TRACING: CPT

## 2025-02-15 PROCEDURE — 72125 CT NECK SPINE W/O DYE: CPT

## 2025-02-15 PROCEDURE — 96376 TX/PRO/DX INJ SAME DRUG ADON: CPT | Performed by: EMERGENCY MEDICINE

## 2025-02-15 PROCEDURE — 80053 COMPREHEN METABOLIC PANEL: CPT

## 2025-02-15 PROCEDURE — 71045 X-RAY EXAM CHEST 1 VIEW: CPT

## 2025-02-15 PROCEDURE — 85025 COMPLETE CBC W/AUTO DIFF WBC: CPT

## 2025-02-15 PROCEDURE — 82947 ASSAY GLUCOSE BLOOD QUANT: CPT

## 2025-02-15 PROCEDURE — 2500000004 HC RX 250 GENERAL PHARMACY W/ HCPCS (ALT 636 FOR OP/ED): Performed by: EMERGENCY MEDICINE

## 2025-02-15 PROCEDURE — 70450 CT HEAD/BRAIN W/O DYE: CPT

## 2025-02-15 PROCEDURE — 36415 COLL VENOUS BLD VENIPUNCTURE: CPT

## 2025-02-15 PROCEDURE — 99285 EMERGENCY DEPT VISIT HI MDM: CPT | Mod: 25 | Performed by: EMERGENCY MEDICINE

## 2025-02-15 PROCEDURE — 70450 CT HEAD/BRAIN W/O DYE: CPT | Performed by: RADIOLOGY

## 2025-02-15 PROCEDURE — 72170 X-RAY EXAM OF PELVIS: CPT | Performed by: RADIOLOGY

## 2025-02-15 PROCEDURE — 71045 X-RAY EXAM CHEST 1 VIEW: CPT | Performed by: RADIOLOGY

## 2025-02-15 PROCEDURE — 72170 X-RAY EXAM OF PELVIS: CPT

## 2025-02-15 PROCEDURE — 2500000004 HC RX 250 GENERAL PHARMACY W/ HCPCS (ALT 636 FOR OP/ED)

## 2025-02-15 RX ORDER — LABETALOL HYDROCHLORIDE 5 MG/ML
INJECTION, SOLUTION INTRAVENOUS
Status: DISCONTINUED
Start: 2025-02-15 | End: 2025-02-15 | Stop reason: HOSPADM

## 2025-02-15 RX ORDER — LABETALOL HYDROCHLORIDE 5 MG/ML
20 INJECTION, SOLUTION INTRAVENOUS ONCE
Status: COMPLETED | OUTPATIENT
Start: 2025-02-15 | End: 2025-02-15

## 2025-02-15 RX ORDER — LABETALOL HYDROCHLORIDE 5 MG/ML
10 INJECTION, SOLUTION INTRAVENOUS ONCE
Status: COMPLETED | OUTPATIENT
Start: 2025-02-15 | End: 2025-02-15

## 2025-02-15 RX ORDER — MORPHINE SULFATE 4 MG/ML
4 INJECTION, SOLUTION INTRAMUSCULAR; INTRAVENOUS ONCE
Status: COMPLETED | OUTPATIENT
Start: 2025-02-15 | End: 2025-02-15

## 2025-02-15 RX ADMIN — LABETALOL HYDROCHLORIDE 10 MG: 5 INJECTION, SOLUTION INTRAVENOUS at 07:09

## 2025-02-15 RX ADMIN — LABETALOL HYDROCHLORIDE 20 MG: 5 INJECTION, SOLUTION INTRAVENOUS at 07:45

## 2025-02-15 RX ADMIN — MORPHINE SULFATE 4 MG: 4 INJECTION, SOLUTION INTRAMUSCULAR; INTRAVENOUS at 04:27

## 2025-02-15 ASSESSMENT — PAIN SCALES - PAIN ASSESSMENT IN ADVANCED DEMENTIA (PAINAD)
NEGVOCALIZATION: REPEATED TROUBLED CALLING OUT, LOUD MOANING/GROANING, CRYING
FACIALEXPRESSION: SMILING OR INEXPRESSIVE
BODYLANGUAGE: RIGID, FISTS CLENCHED, KNEES UP, PUSHING/PULLING AWAY, STRIKES OUT
BREATHING: OCCASIONAL LABORED BREATHING, SHORT PERIOD OF HYPERVENTILATION
CONSOLABILITY: UNABLE TO CONSOLE, DISTRACT OR REASSURE
TOTALSCORE: 7

## 2025-02-15 ASSESSMENT — PAIN SCALES - GENERAL
PAINLEVEL_OUTOF10: 0 - NO PAIN

## 2025-02-15 ASSESSMENT — COLUMBIA-SUICIDE SEVERITY RATING SCALE - C-SSRS
6. HAVE YOU EVER DONE ANYTHING, STARTED TO DO ANYTHING, OR PREPARED TO DO ANYTHING TO END YOUR LIFE?: NO
2. HAVE YOU ACTUALLY HAD ANY THOUGHTS OF KILLING YOURSELF?: NO
1. IN THE PAST MONTH, HAVE YOU WISHED YOU WERE DEAD OR WISHED YOU COULD GO TO SLEEP AND NOT WAKE UP?: NO

## 2025-02-15 ASSESSMENT — PAIN - FUNCTIONAL ASSESSMENT
PAIN_FUNCTIONAL_ASSESSMENT: 0-10
PAIN_FUNCTIONAL_ASSESSMENT: 0-10

## 2025-02-15 NOTE — ED PROVIDER NOTES
EMERGENCY DEPARTMENT ENCOUNTER      Pt Name: Bret Harmon  MRN: [id number]  Birthdate 1950  Date of evaluation: 2/15/2025  Provider: Jay Bruno DO    CHIEF COMPLAINT       Chief Complaint   Patient presents with    Fall     Per EMS pt had an unwitnessed fall at his nursing home with bleeding to post suture site. No blood thinner. EMS placed c-collar on patient prior to arrival          HISTORY OF PRESENT ILLNESS    74-year-old male, memory care, dementia history, comes to the emergency room after a fall.  Also has history of depression, hyperlipidemia, coronary artery disease, hypertension, COPD.  Had an unwitnessed fall.  Was here few days ago for a fall, has a laceration on his head.  Patient is not historian for himself.  EMS did place patient in c-collar      History provided by:  Patient      Nursing Notes were reviewed.    PAST MEDICAL HISTORY     Past Medical History:   Diagnosis Date    COPD (chronic obstructive pulmonary disease) (Multi)     Coronary artery disease     Dementia     Depression     Dysphagia     Hyperlipidemia     Hypertension     Insomnia     Muscle weakness     Peripheral vascular disease (CMS-HCC)     Prediabetes     Protein calorie malnutrition (Multi)          SURGICAL HISTORY     History reviewed. No pertinent surgical history.      CURRENT MEDICATIONS       Previous Medications    ATORVASTATIN (LIPITOR) 40 MG TABLET    Take 1 tablet (40 mg) by mouth once daily.    BISACODYL (DULCOLAX) 10 MG SUPPOSITORY    Insert 1 suppository (10 mg) into the rectum once daily as needed for constipation.    CARVEDILOL (COREG) 6.25 MG TABLET    Take 1 tablet (6.25 mg) by mouth 2 times a day.    CYANOCOBALAMIN (VITAMIN B-12) 250 MCG TABLET    Take 1 tablet (250 mcg) by mouth once daily.    ISOSORBIDE MONONITRATE ER (IMDUR) 30 MG 24 HR TABLET    Take 1 tablet (30 mg) by mouth once daily. Do not crush or chew.    LEVETIRACETAM (KEPPRA) 500 MG TABLET    Take 1 tablet (500 mg) by mouth 2 times a day for  14 doses.    MAGNESIUM HYDROXIDE (MILK OF MAGNESIA) 400 MG/5 ML SUSPENSION    Take 30 mL by mouth once daily as needed for constipation.    MELATONIN 3 MG TABLET    Take 1 tablet (3 mg) by mouth once daily at bedtime.    MIRTAZAPINE (REMERON) 7.5 MG TABLET    Take 1 tablet (7.5 mg) by mouth once daily at bedtime.    NITROGLYCERIN (NITROSTAT) 0.4 MG SL TABLET    Place 1 tablet (0.4 mg) under the tongue every 5 minutes if needed for chest pain.    OXYMETAZOLINE (AFRIN) 0.05 % NASAL SPRAY    Administer 2 sprays into each nostril every 12 hours if needed for congestion for up to 4 days. Do not use for more than 3 days.    PANTOPRAZOLE (PROTONIX) 20 MG EC TABLET    Take 1 tablet (20 mg) by mouth once daily in the morning. Take before meals. Do not crush, chew, or split.    RIVASTIGMINE (EXELON) 3 MG CAPSULE    Take 1 capsule (3 mg) by mouth 2 times a day.    TIOTROPIUM (SPIRIVA RESPIMAT) 2.5 MCG/ACTUATION INHALER    Inhale 2 puffs once daily.       ALLERGIES     Bee pollen    FAMILY HISTORY       Family History   Family history unknown: Yes          SOCIAL HISTORY       Social History     Socioeconomic History    Marital status: Single   Tobacco Use    Smoking status: Former     Types: Cigarettes   Substance and Sexual Activity    Alcohol use: Not Currently    Drug use: Never     Social Drivers of Health     Financial Resource Strain: Patient Unable To Answer (2/6/2025)    Overall Financial Resource Strain (CARDIA)     Difficulty of Paying Living Expenses: Patient unable to answer   Food Insecurity: Patient Unable To Answer (2/6/2025)    Hunger Vital Sign     Worried About Running Out of Food in the Last Year: Patient unable to answer     Ran Out of Food in the Last Year: Patient unable to answer   Transportation Needs: Patient Unable To Answer (2/6/2025)    PRAPARE - Transportation     Lack of Transportation (Medical): Patient unable to answer     Lack of Transportation (Non-Medical): Patient unable to answer   Physical  Activity: Patient Unable To Answer (2/6/2025)    Exercise Vital Sign     Days of Exercise per Week: Patient unable to answer     Minutes of Exercise per Session: Patient unable to answer   Stress: Patient Unable To Answer (2/6/2025)    [facility] [employer] [facility] [employer] - Occupational Stress Questionnaire     Feeling of Stress : Patient unable to answer   Social Connections: Unknown (8/1/2022)    Received from [facility]    Social Connection and Isolation Panel [NHANES]     Frequency of Communication with Friends and Family: More than three times a week     Frequency of Social Gatherings with Friends and Family: More than three times a week     Attends Confucianism Services: Never     Active Member of Clubs or Organizations: No     Attends Club or Organization Meetings: Never   Recent Concern: Social Connections - Socially Isolated (6/22/2022)    Received from [facility]    Social Connection and Isolation Panel [NHANES]     Frequency of Communication with Friends and Family: More than three times a week     Frequency of Social Gatherings with Friends and Family: Twice a week     Attends Confucianism Services: Never     Active Member of Clubs or Organizations: No     Attends Club or Organization Meetings: Never     Marital Status:    Intimate Partner Violence: Patient Unable To Answer (2/6/2025)    Humiliation, Afraid, Rape, and Kick questionnaire     Fear of Current or Ex-Partner: Patient unable to answer     Emotionally Abused: Patient unable to answer     Physically Abused: Patient unable to answer     Sexually Abused: Patient unable to answer   Housing Stability: Patient Unable To Answer (2/6/2025)    Housing Stability Vital Sign     Unable to Pay for Housing in the Last Year: Patient unable to answer     Number of Times Moved in the Last Year: 0     Homeless in the Last Year: Patient unable to answer       SCREENINGS                        PHYSICAL EXAM    (up to 7 for level 4, 8 or more for level  5)     ED Triage Vitals [02/15/25 0235]   Temperature Heart Rate Respirations BP   36.7 °C (98.1 °F) 77 20 166/79      Pulse Ox Temp Source Heart Rate Source Patient Position   97 % Temporal -- --      BP Location FiO2 (%)     Right arm --       Physical Exam  Vitals and nursing note reviewed.   Constitutional:       General: He is not in acute distress.  HENT:      Head: Normocephalic and atraumatic.      Comments: There is a scab on the patient's forehead, there is an area of slight wound D high since, it is not gaping.    No cephalohematomas  Eyes:      General: No scleral icterus.        Right eye: No discharge.         Left eye: No discharge.      Conjunctiva/sclera: Conjunctivae normal.   Neck:      Comments: C-collar in place  Cardiovascular:      Rate and Rhythm: Normal rate and regular rhythm.      Pulses: Normal pulses.   Pulmonary:      Effort: Pulmonary effort is normal.   Abdominal:      General: Abdomen is flat.      Palpations: Abdomen is soft.      Tenderness: There is no abdominal tenderness. There is no guarding or rebound.   Musculoskeletal:         General: No deformity.      Right lower leg: No edema.      Left lower leg: No edema.   Skin:     General: Skin is warm and dry.   Neurological:      Mental Status: He is alert. Mental status is at baseline.   Psychiatric:         Mood and Affect: Mood normal.         Behavior: Behavior normal.          DIAGNOSTIC RESULTS     LABS:  Labs Reviewed   CBC WITH AUTO DIFFERENTIAL - Abnormal       Result Value    WBC 10.4      nRBC 0.0      RBC 3.75 (*)     Hemoglobin 10.3 (*)     Hematocrit 34.5 (*)     MCV 92      MCH 27.5      MCHC 29.9 (*)     RDW 14.8 (*)     Platelets 552 (*)     Neutrophils % 68.5      Immature Granulocytes %, Automated 0.5      Lymphocytes % 21.3      Monocytes % 7.2      Eosinophils % 1.8      Basophils % 0.7      Neutrophils Absolute 7.09 (*)     Immature Granulocytes Absolute, Automated 0.05      Lymphocytes Absolute 2.20       Monocytes Absolute 0.75      Eosinophils Absolute 0.19      Basophils Absolute 0.07     COMPREHENSIVE METABOLIC PANEL - Abnormal    Glucose 108 (*)     Sodium 145      Potassium 4.2      Chloride 109 (*)     Bicarbonate 27      Anion Gap 13      Urea Nitrogen 18      Creatinine 0.97      eGFR 81      Calcium 9.8      Albumin 3.8      Alkaline Phosphatase 77      Total Protein 8.4 (*)     AST 14      Bilirubin, Total 0.3      ALT 12     TROPONIN I, HIGH SENSITIVITY - Normal    Troponin I, High Sensitivity 12      Narrative:     Less than 99th percentile of normal range cutoff-  Female and children under 18 years old <14 ng/L; Male <21 ng/L: Negative  Repeat testing should be performed if clinically indicated.     Female and children under 18 years old 14-50 ng/L; Male 21-50 ng/L:  Consistent with possible cardiac damage and possible increased clinical   risk. Serial measurements may help to assess extent of myocardial damage.     >50 ng/L: Consistent with cardiac damage, increased clinical risk and  myocardial infarction. Serial measurements may help assess extent of   myocardial damage.      NOTE: Children less than 1 year old may have higher baseline troponin   levels and results should be interpreted in conjunction with the overall   clinical context.     NOTE: Troponin I testing is performed using a different   testing methodology at [facility] than at other   [facility]. Direct result comparisons should only   be made within the same method.   MORPHOLOGY    RBC Morphology See Below      Polychromasia Mild         All other labs were within normal range or not returned as of this dictation.    Imaging  XR pelvis 1-2 views   Final Result   1.  No radiographic evidence for acute fracture .                  MACRO:   None.        Signed by: Shilpa Nichols 2/15/2025 5:00 AM   Dictation workstation:   [id number]      XR chest 1 view   Final Result   1.  No radiographic evidence of acute  cardiopulmonary process.                  MACRO:   None.        Signed by: Shilpa Nichols 2/15/2025 4:58 AM   Dictation workstation:   [id number]      CT head wo IV contrast   Final Result   New trace acute subarachnoid hemorrhage in the left posterior   temporal lobe.        Bilateral subdural hematomas overlying the cerebral convexities,   measuring up to 5 mm. Both are new since 02/05/2025, however, based   on the density of the blood products, the collection on the left is   likely acute on subacute, while the collection on the right is   favored to be subacute.        No significant mass effect or midline shift.        No acute fracture or traumatic malalignment of the cervical spine.             MACRO:   Paradise Lopez discussed the significance and urgency of this critical   finding by [employer] chat with read acknowledgement with  WU JEONG on 2/15/2025 at 3:30 am.  (**-RCF-**) Findings:  See   findings.        Signed by: Paradise Lopez 2/15/2025 3:31 AM   Dictation workstation:   [id number]      CT cervical spine wo IV contrast   Final Result   New trace acute subarachnoid hemorrhage in the left posterior   temporal lobe.        Bilateral subdural hematomas overlying the cerebral convexities,   measuring up to 5 mm. Both are new since 02/05/2025, however, based   on the density of the blood products, the collection on the left is   likely acute on subacute, while the collection on the right is   favored to be subacute.        No significant mass effect or midline shift.        No acute fracture or traumatic malalignment of the cervical spine.             MACRO:   Paradise Lopez discussed the significance and urgency of this critical   finding by EPIC secure chat with read acknowledgement with  WU JEONG on 2/15/2025 at 3:30 am.  (**-RCF-**) Findings:  See   findings.        Signed by: Paradise Lopez 2/15/2025 3:31 AM   Dictation workstation:   [id number]      CT head wo IV contrast     (Results Pending)        Procedures  Procedures     EMERGENCY DEPARTMENT COURSE/MDM:     ED Course as of 02/15/25 0603   Sat Feb 15, 2025   1468 Dr. Abdul of neurosurgery recommended repeat CT in 6 hours, if stable, patient can be discharged.  He believes that this subarachnoid hemorrhage is chronic. [RD]   0600 EKG shows sinus rhythm 79 bpm QTc 454, no acute injury pattern.  It does show a right bundle branch block.  This does appear to be chronic. [RD]      ED Course User Index  [RD] Jay Bruno DO         Diagnoses as of 02/15/25 0603   Subarachnoid bleed (Multi)        Medical Decision Making  74-year-old male comes emergency for a fall.  History of dementia, memory care, not a historian for himself, did order CT head, C-spine and 1 to evaluate for possible intracranial process such as hemorrhage.  He was seen here recently for a fall as well, is a laceration on his forehead, there did appear to be a little bit of wound dehiscence but I do not believe that this needs to be sutured again.  He is set to have his sutures out on Monday.  Did order x-ray of the chest and pelvis along with a cardiac workup.    Independent review of labs, CBC shows normal white count, stable hemoglobin, elevated platelet levels here today.  Chemistry shows normal electrolytes, renal and hepatic function.  Troponin is negative, EKG shows sinus rhythm 79 bpm QTc 454, no acute injury pattern.  It does show a right bundle branch block.  This does appear to be chronic.  No pneumonia or pneumothorax on chest x-ray, x-ray pelvis shows no acute bony pathology, CT C-spine unremarkable, CT head shows possible subarachnoid hemorrhage, neurosurgeon Dr. Abdul thought that this was chronic, did recommended stability scan in 6 hours, said if it was stable, patient to be discharged.  Patient signed out to morning team pending stability scan disposition.        Patient and or family in agreement and understanding of treatment plan.  All questions  answered.      I reviewed the case with the attending ED physician. The attending ED physician agrees with the plan. Patient and/or patient´s representative was counseled regarding labs, imaging, likely diagnosis, and plan. All questions were answered.    ED Medications administered this visit:    Medications   morphine injection 4 mg (4 mg intravenous Given 2/15/25 5076)     Final Impression:   1. Subarachnoid bleed (Multi)          (Please note that portions of this note were completed with a voice recognition program.  Efforts were made to edit the dictations but occasionally words are mis-transcribed.)     Jay Bruno DO  Resident  02/15/25 2507     diagnosis, and plan. All questions were answered.    ED Medications administered this visit:    Medications   morphine injection 4 mg (4 mg intravenous Given 2/15/25 3328)     Final Impression:   1. Subarachnoid bleed (Multi)          (Please note that portions of this note were completed with a voice recognition program.  Efforts were made to edit the dictations but occasionally words are mis-transcribed.)     Jay Bruno DO  Resident  02/15/25 0603      The patient was seen by the resident/fellow.  I have personally performed a substantive portion of the encounter.  I have seen and examined the patient; agree with the workup, evaluation, MDM, management and diagnosis.  The care plan has been discussed with the resident; I have reviewed the resident’s note and agree with the documented findings.                                                    Fredo Borja DO  02/18/25 0547

## 2025-02-15 NOTE — CONSULTS
"Reason For Consult  Unwitnessed fall    History Of Present Illness  Bret Harmon is a 74 y.o. male presenting with unwitnessed fall. Patient is a poor historian; demented at baseline. Neurosurgery consulted for b/l SDH and scant tSAH without mass effect of brain compression.  Past Medical History  He has a past medical history of COPD (chronic obstructive pulmonary disease) (Multi), Coronary artery disease, Dementia, Depression, Dysphagia, Hyperlipidemia, Hypertension, Insomnia, Muscle weakness, Peripheral vascular disease (CMS-[facility]), Prediabetes, and Protein calorie malnutrition (Multi).    Surgical History  He has no past surgical history on file.     Social History  He reports that he has quit smoking. His smoking use included cigarettes. He does not have any smokeless tobacco history on file. He reports that he does not currently use alcohol. He reports that he does not use drugs.    Family History  Family History   Family history unknown: Yes        Allergies  Bee pollen    Review of Systems  Unable to obtain     Physical Exam  Ox1, significant psychomotor slowing  Awake, regards  Ou3mm, reactive  Fcx4 with persistence, >AG     Last Recorded Vitals  Blood pressure (!) 199/87, pulse 80, temperature 36.7 °C (98.1 °F), temperature source Temporal, resp. rate 17, height 1.702 m (5' 7\"), weight 68.9 kg (152 lb), SpO2 98%.    Relevant Results  Imaging was personally reviewed; patient has b/l chronic SDH that are overall noncompressive. Also with scant traumatic subarachnoid hemorrhage.     Assessment/Plan     74M with h/o dementia presenting after fall.    Noncompressive chronic SDH and scant acute tSAH.    RECS    No neurosurgical intervention  Agree with stability CTH at 6h--if stable, okay for discharge without neurosurgical follow-up  If admitted, okay for DVT chemoppx post-injury day 2    Please Haiku with any further questions        Mike Kaur MD    "

## 2025-02-15 NOTE — PROGRESS NOTES
Emergency Medicine Transition of Care Note.    I received Bret Harmon in signout from Dr. Bruno.  Please see the previous ED provider note for all HPI, PE and MDM up to the time of signout at 0700. This is in addition to the primary record.    In brief Bret Harmon is an 74 y.o. male presenting for   Chief Complaint   Patient presents with    Fall     Per EMS pt had an unwitnessed fall at his nursing home with bleeding to post suture site. No blood thinner. EMS placed c-collar on patient prior to arrival      At the time of signout we were awaitin hour repeat head CT at 0900 for dispo    Medical Decision Making      ED Course as of 02/15/25 1835   Sat Feb 15, 2025   0343 Dr. Abdul of neurosurgery recommended repeat CT in 6 hours, if stable, patient can be discharged.  He believes that this subarachnoid hemorrhage is chronic. [RD]   0600 EKG shows sinus rhythm 79 bpm QTc 454, no acute injury pattern.  It does show a right bundle branch block.  This does appear to be chronic. [RD]   0742 Patient signed out to me by the outgoing ED provider.  74-year-old male with history of dementia presenting after unwitnessed fall.  He did have a CT which showed acute on chronic subdural and subarachnoid.  Previous providers had spoke with neurosurgery who feels that this most likely is more of a chronic finding.  Recommendation was for repeat scan in 6 hours.  Patient blood pressure has been going up, he was given 10 mg of labetalol.  On my evaluation blood pressure in the 190s, 20 mg labetalol ordered. [JJ]   1056 CT head wo IV contrast  IMPRESSION:  No significant interval change with unchanged trace subarachnoid  hemorrhage in the left posterior temporal lobe, and bilateral  subdural hematomas. No midline shift or new site of intracranial  hemorrhage is evident.   [CB]   1834 Discussed repeat imaging results with Dr. Abdul, neurosurgery, who recommended outpatient follow up. [CB]   1835 Shared decision making for  disposition  Patient and/or patient´s representative was counseled regarding labs, imaging, likely diagnosis. All questions were answered. Recommendation was made   for discharge home. The patient agreed and was discharged home in stable condition with appropriate relevant educational materials. Return precautions were provided which included worsening headache, vomiting, fever of 38C (100.4) or higher, vision changes, confusion, loss of motion or feeling in your arms or legs, loss of control of your urine or stool, neck pain, fainting, seizure, or any new or worsening symptoms. .    [CB]      ED Course User Index  [CB] Tien Kiran DO  [JJ] Seth Husain DO  [RD] Jay Bruno DO         Diagnoses as of 02/15/25 1835   Subarachnoid bleed (Multi)         Final diagnoses:   [I60.9] Subarachnoid bleed (Multi)           Procedure  Procedures    Tien Kiran DO     Reviewed and approved by TIEN KIRAN on 2/15/25 at 6:35 PM.

## 2025-02-15 NOTE — DISCHARGE INSTRUCTIONS
Please return to the ER or seek immediate medical attention if you experience worsening headache, vomiting, fever of 38C (100.4) or higher, vision changes, confusion, loss of motion or feeling in your arms or legs, loss of control of your urine or stool, neck pain, fainting, seizure, or any new or worsening symptoms.     You are welcome back any time. Thank you for entrusting your care to us, I hope we made your visit as pleasant as possible. Wishing you well!    Dr. Kiran

## 2025-02-16 LAB
ATRIAL RATE: 79 BPM
P AXIS: 78 DEGREES
P OFFSET: 191 MS
P ONSET: 136 MS
PR INTERVAL: 154 MS
Q ONSET: 213 MS
QRS COUNT: 13 BEATS
QRS DURATION: 126 MS
QT INTERVAL: 396 MS
QTC CALCULATION(BAZETT): 454 MS
QTC FREDERICIA: 434 MS
R AXIS: -65 DEGREES
T AXIS: 64 DEGREES
T OFFSET: 411 MS
VENTRICULAR RATE: 79 BPM

## 2025-02-21 ENCOUNTER — HOSPITAL ENCOUNTER (OUTPATIENT)
Dept: RADIOLOGY | Facility: HOSPITAL | Age: 75
Discharge: HOME | End: 2025-02-21
Payer: COMMERCIAL

## 2025-02-21 DIAGNOSIS — S06.5XAA SUBDURAL HEMATOMA (MULTI): ICD-10-CM

## 2025-02-21 PROCEDURE — 70450 CT HEAD/BRAIN W/O DYE: CPT

## 2025-02-22 LAB
ATRIAL RATE: 107 BPM
P AXIS: 69 DEGREES
P OFFSET: 171 MS
P ONSET: 129 MS
PR INTERVAL: 130 MS
Q ONSET: 194 MS
QRS COUNT: 17 BEATS
QRS DURATION: 126 MS
QT INTERVAL: 376 MS
QTC CALCULATION(BAZETT): 501 MS
QTC FREDERICIA: 456 MS
R AXIS: -86 DEGREES
T AXIS: 72 DEGREES
T OFFSET: 382 MS
VENTRICULAR RATE: 107 BPM